# Patient Record
Sex: FEMALE | Race: WHITE | NOT HISPANIC OR LATINO | Employment: UNEMPLOYED | URBAN - METROPOLITAN AREA
[De-identification: names, ages, dates, MRNs, and addresses within clinical notes are randomized per-mention and may not be internally consistent; named-entity substitution may affect disease eponyms.]

---

## 2018-04-02 ENCOUNTER — HOSPITAL ENCOUNTER (OUTPATIENT)
Dept: GENERAL RADIOLOGY | Facility: HOSPITAL | Age: 28
Discharge: HOME | End: 2018-04-02
Attending: PAIN MEDICINE | Admitting: PAIN MEDICINE

## 2018-04-02 DIAGNOSIS — M54.42 CHRONIC BILATERAL LOW BACK PAIN WITH BILATERAL SCIATICA: Primary | ICD-10-CM

## 2018-04-02 DIAGNOSIS — G89.29 CHRONIC BILATERAL LOW BACK PAIN WITH BILATERAL SCIATICA: Primary | ICD-10-CM

## 2018-04-02 DIAGNOSIS — G89.29 CHRONIC BILATERAL LOW BACK PAIN WITH BILATERAL SCIATICA: ICD-10-CM

## 2018-04-02 DIAGNOSIS — M54.42 CHRONIC BILATERAL LOW BACK PAIN WITH BILATERAL SCIATICA: ICD-10-CM

## 2018-04-02 DIAGNOSIS — M54.41 CHRONIC BILATERAL LOW BACK PAIN WITH BILATERAL SCIATICA: Primary | ICD-10-CM

## 2018-04-02 DIAGNOSIS — M54.41 CHRONIC BILATERAL LOW BACK PAIN WITH BILATERAL SCIATICA: ICD-10-CM

## 2018-04-02 PROCEDURE — 72100 X-RAY EXAM L-S SPINE 2/3 VWS: CPT

## 2019-02-05 ENCOUNTER — OFFICE VISIT (OUTPATIENT)
Dept: INTERNAL MEDICINE | Facility: CLINIC | Age: 29
End: 2019-02-05

## 2019-02-05 VITALS
OXYGEN SATURATION: 98 % | HEART RATE: 96 BPM | WEIGHT: 223 LBS | BODY MASS INDEX: 35 KG/M2 | DIASTOLIC BLOOD PRESSURE: 72 MMHG | SYSTOLIC BLOOD PRESSURE: 110 MMHG | HEIGHT: 67 IN

## 2019-02-05 DIAGNOSIS — F41.9 ANXIETY AND DEPRESSION: Primary | ICD-10-CM

## 2019-02-05 DIAGNOSIS — F32.A ANXIETY AND DEPRESSION: Primary | ICD-10-CM

## 2019-02-05 PROCEDURE — 99203 OFFICE O/P NEW LOW 30 MIN: CPT | Performed by: NURSE PRACTITIONER

## 2019-02-05 RX ORDER — DULOXETIN HYDROCHLORIDE 20 MG/1
20 CAPSULE, DELAYED RELEASE ORAL DAILY
Qty: 30 CAPSULE | Refills: 1 | Status: SHIPPED | OUTPATIENT
Start: 2019-02-05 | End: 2019-03-07 | Stop reason: SDUPTHER

## 2019-02-05 NOTE — PROGRESS NOTES
Subjective   Bir Jean is a 28 y.o. female. Patient is here today for   Chief Complaint   Patient presents with   • Anxiety     Pt here to discuss anxiety/depression. Pt has not taken anything for this in the past.    .    History of Present Illness   New patient here to establish care.  Health history and questionnaire have been reviewed in its entirety. She has not had a primary care provider in several years.   Patient is here to discuss concerns with anxiety and depression. States that she had anxiety as a child, but has never received treatment for it, including counseling. She does use a weighted blanket for anxiety that helps with sleep.  She has had 2 back surgeries and continues with occasional back pain.  She takes Excedrin 2-3 times per week to help with this pain.  Because of the pain, she doesn't feel like she is as active as she would like to be.  This does seem to aggravate symptoms of depression.  Patient would like a recommendation for a psychologist or counselor.     The following portions of the patient's history were reviewed and updated as appropriate: allergies, current medications, past family history, past medical history, past social history, past surgical history and problem list.    Review of Systems   Constitutional: Negative.    Respiratory: Negative.    Cardiovascular: Negative.    Psychiatric/Behavioral: Positive for dysphoric mood. The patient is nervous/anxious.        Objective   Vitals:    02/05/19 1017   BP: 110/72   Pulse: 96   SpO2: 98%     Physical Exam   Constitutional: Vital signs are normal. She appears well-developed and well-nourished.   Cardiovascular: Normal rate, regular rhythm and normal heart sounds.   Pulmonary/Chest: Effort normal and breath sounds normal.   Skin: Skin is warm, dry and intact.   Psychiatric: She has a normal mood and affect. Her speech is normal and behavior is normal. Thought content normal.   Tearful at times during office visit   Nursing  note and vitals reviewed.      Assessment/Plan   Bri was seen today for anxiety.    Diagnoses and all orders for this visit:    Anxiety and depression  -     DULoxetine (CYMBALTA) 20 MG capsule; Take 1 capsule by mouth Daily.      Will start patient on cymbalta to see if this will help improve her pain also. F/u in one month for physical, fasting labs, and recheck on depression and anxiety.  She was also given a list of psychologists to call and set up counseling.

## 2019-03-01 DIAGNOSIS — Z00.00 ANNUAL PHYSICAL EXAM: Primary | ICD-10-CM

## 2019-03-04 LAB
25(OH)D3+25(OH)D2 SERPL-MCNC: 17.1 NG/ML (ref 30–100)
ALBUMIN SERPL-MCNC: 4.9 G/DL (ref 3.5–5.2)
ALBUMIN/GLOB SERPL: 1.9 G/DL
ALP SERPL-CCNC: 77 U/L (ref 39–117)
ALT SERPL-CCNC: 16 U/L (ref 1–33)
AST SERPL-CCNC: 30 U/L (ref 1–32)
BILIRUB SERPL-MCNC: 0.3 MG/DL (ref 0.1–1.2)
BUN SERPL-MCNC: 13 MG/DL (ref 6–20)
BUN/CREAT SERPL: 18.8 (ref 7–25)
CALCIUM SERPL-MCNC: 9.9 MG/DL (ref 8.6–10.5)
CHLORIDE SERPL-SCNC: 102 MMOL/L (ref 98–107)
CHOLEST SERPL-MCNC: 205 MG/DL (ref 0–200)
CO2 SERPL-SCNC: 25.1 MMOL/L (ref 22–29)
CREAT SERPL-MCNC: 0.69 MG/DL (ref 0.57–1)
ERYTHROCYTE [DISTWIDTH] IN BLOOD BY AUTOMATED COUNT: 12.7 % (ref 12.3–15.4)
GLOBULIN SER CALC-MCNC: 2.6 GM/DL
GLUCOSE SERPL-MCNC: 122 MG/DL (ref 65–99)
HCT VFR BLD AUTO: 46.1 % (ref 34–46.6)
HDLC SERPL-MCNC: 40 MG/DL (ref 40–60)
HGB BLD-MCNC: 15 G/DL (ref 12–15.9)
LDLC SERPL CALC-MCNC: 144 MG/DL (ref 0–100)
LDLC/HDLC SERPL: 3.61 {RATIO}
MCH RBC QN AUTO: 28.7 PG (ref 26.6–33)
MCHC RBC AUTO-ENTMCNC: 32.5 G/DL (ref 31.5–35.7)
MCV RBC AUTO: 88.3 FL (ref 79–97)
PLATELET # BLD AUTO: 267 10*3/MM3 (ref 140–450)
POTASSIUM SERPL-SCNC: 4 MMOL/L (ref 3.5–5.2)
PROT SERPL-MCNC: 7.5 G/DL (ref 6–8.5)
RBC # BLD AUTO: 5.22 10*6/MM3 (ref 3.77–5.28)
SODIUM SERPL-SCNC: 140 MMOL/L (ref 136–145)
TRIGL SERPL-MCNC: 104 MG/DL (ref 0–150)
TSH SERPL DL<=0.005 MIU/L-ACNC: 1.19 MIU/ML (ref 0.27–4.2)
VLDLC SERPL CALC-MCNC: 20.8 MG/DL (ref 5–40)
WBC # BLD AUTO: 6.83 10*3/MM3 (ref 3.4–10.8)

## 2019-03-06 LAB
HBA1C MFR BLD: 5.65 % (ref 4.8–5.6)
Lab: NORMAL
WRITTEN AUTHORIZATION: NORMAL

## 2019-03-07 ENCOUNTER — OFFICE VISIT (OUTPATIENT)
Dept: INTERNAL MEDICINE | Facility: CLINIC | Age: 29
End: 2019-03-07

## 2019-03-07 VITALS
DIASTOLIC BLOOD PRESSURE: 68 MMHG | WEIGHT: 226 LBS | HEART RATE: 76 BPM | OXYGEN SATURATION: 98 % | SYSTOLIC BLOOD PRESSURE: 110 MMHG | BODY MASS INDEX: 35.47 KG/M2 | HEIGHT: 67 IN

## 2019-03-07 DIAGNOSIS — Z23 NEED FOR TDAP VACCINATION: ICD-10-CM

## 2019-03-07 DIAGNOSIS — E78.5 HYPERLIPIDEMIA, UNSPECIFIED HYPERLIPIDEMIA TYPE: ICD-10-CM

## 2019-03-07 DIAGNOSIS — F41.9 ANXIETY AND DEPRESSION: ICD-10-CM

## 2019-03-07 DIAGNOSIS — R73.01 ELEVATED FASTING GLUCOSE: ICD-10-CM

## 2019-03-07 DIAGNOSIS — F32.A ANXIETY AND DEPRESSION: ICD-10-CM

## 2019-03-07 DIAGNOSIS — Z00.00 HEALTH CARE MAINTENANCE: Primary | ICD-10-CM

## 2019-03-07 DIAGNOSIS — E55.9 VITAMIN D DEFICIENCY: ICD-10-CM

## 2019-03-07 PROCEDURE — 90471 IMMUNIZATION ADMIN: CPT | Performed by: NURSE PRACTITIONER

## 2019-03-07 PROCEDURE — 90715 TDAP VACCINE 7 YRS/> IM: CPT | Performed by: NURSE PRACTITIONER

## 2019-03-07 PROCEDURE — 99214 OFFICE O/P EST MOD 30 MIN: CPT | Performed by: NURSE PRACTITIONER

## 2019-03-07 PROCEDURE — 99395 PREV VISIT EST AGE 18-39: CPT | Performed by: NURSE PRACTITIONER

## 2019-03-07 RX ORDER — DULOXETINE 40 MG/1
40 CAPSULE, DELAYED RELEASE ORAL DAILY
Qty: 30 CAPSULE | Refills: 3 | Status: SHIPPED | OUTPATIENT
Start: 2019-03-07 | End: 2019-03-11 | Stop reason: SDUPTHER

## 2019-03-07 NOTE — PROGRESS NOTES
"Subjective   Bri Jean is a 28 y.o. female and is here for a comprehensive physical exam. The patient reports no problems.    Patient is also here to follow up on anxiety.  She was recently started on Cymbalta.  States that she did have some headaches and nausea in the first couple of weeks, but those side effects have gone away.  States that she does feel better, but still has episodes of anxiety.  She has not noticed if the medication has helped with her pain at all. She hasn't started counseling yet, but will be doing that soon.    Do you take any herbs or supplements that were not prescribed by a doctor? no  Are you taking aspirin daily? no     History:  Pap 2017    The following portions of the patient's history were reviewed and updated as appropriate: allergies, current medications, past family history, past medical history, past social history, past surgical history and problem list.    Review of Systems  Do you have pain that bothers you in your daily life? sees pain management  Pertinent items are noted in HPI.    Objective   /68 (BP Location: Left arm, Patient Position: Sitting, Cuff Size: Large Adult)   Pulse 76   Ht 170.2 cm (67\")   Wt 103 kg (226 lb)   SpO2 98%   BMI 35.40 kg/m²     General Appearance:    Alert, cooperative, no distress, appears stated age   Head:    Normocephalic, without obvious abnormality, atraumatic   Eyes:    PERRL, conjunctiva/corneas clear, EOM's intact, fundi     benign, both eyes   Ears:    Normal TM's and external ear canals, both ears   Nose:   Nares normal, septum midline, mucosa normal, no drainage    or sinus tenderness   Throat:   Lips, mucosa, and tongue normal; teeth and gums normal   Neck:   Supple, symmetrical, trachea midline, no adenopathy;     thyroid:  no enlargement/tenderness/nodules; no carotid    bruit or JVD   Back:     Symmetric, no curvature, ROM normal, no CVA tenderness   Lungs:     Clear to auscultation bilaterally, respirations " unlabored   Chest Wall:    No tenderness or deformity    Heart:    Regular rate and rhythm, S1 and S2 normal, no murmur, rub   or gallop       Abdomen:     Soft, non-tender, bowel sounds active all four quadrants,     no masses, no organomegaly           Extremities:   Extremities normal, atraumatic, no cyanosis or edema   Pulses:   2+ and symmetric all extremities   Skin:   Skin color, texture, turgor normal, no rashes or lesions   Lymph nodes:   Cervical, supraclavicular, and axillary nodes normal   Neurologic:   CNII-XII intact, normal strength, sensation and reflexes     throughout     Orders Only on 03/01/2019   Component Date Value Ref Range Status   • Glucose 03/04/2019 122* 65 - 99 mg/dL Final   • BUN 03/04/2019 13  6 - 20 mg/dL Final   • Creatinine 03/04/2019 0.69  0.57 - 1.00 mg/dL Final   • eGFR Non African Am 03/04/2019 101  >60 mL/min/1.73 Final   • eGFR African Am 03/04/2019 123  >60 mL/min/1.73 Final   • BUN/Creatinine Ratio 03/04/2019 18.8  7.0 - 25.0 Final   • Sodium 03/04/2019 140  136 - 145 mmol/L Final   • Potassium 03/04/2019 4.0  3.5 - 5.2 mmol/L Final   • Chloride 03/04/2019 102  98 - 107 mmol/L Final   • Total CO2 03/04/2019 25.1  22.0 - 29.0 mmol/L Final   • Calcium 03/04/2019 9.9  8.6 - 10.5 mg/dL Final   • Total Protein 03/04/2019 7.5  6.0 - 8.5 g/dL Final   • Albumin 03/04/2019 4.90  3.50 - 5.20 g/dL Final   • Globulin 03/04/2019 2.6  gm/dL Final   • A/G Ratio 03/04/2019 1.9  g/dL Final   • Total Bilirubin 03/04/2019 0.3  0.1 - 1.2 mg/dL Final   • Alkaline Phosphatase 03/04/2019 77  39 - 117 U/L Final   • AST (SGOT) 03/04/2019 30  1 - 32 U/L Final   • ALT (SGPT) 03/04/2019 16  1 - 33 U/L Final   • WBC 03/04/2019 6.83  3.40 - 10.80 10*3/mm3 Final   • RBC 03/04/2019 5.22  3.77 - 5.28 10*6/mm3 Final   • Hemoglobin 03/04/2019 15.0  12.0 - 15.9 g/dL Final   • Hematocrit 03/04/2019 46.1  34.0 - 46.6 % Final   • MCV 03/04/2019 88.3  79.0 - 97.0 fL Final   • MCH 03/04/2019 28.7  26.6 - 33.0 pg  Final   • MCHC 03/04/2019 32.5  31.5 - 35.7 g/dL Final   • RDW 03/04/2019 12.7  12.3 - 15.4 % Final   • Platelets 03/04/2019 267  140 - 450 10*3/mm3 Final   • Total Cholesterol 03/04/2019 205* 0 - 200 mg/dL Final   • Triglycerides 03/04/2019 104  0 - 150 mg/dL Final   • HDL Cholesterol 03/04/2019 40  40 - 60 mg/dL Final   • VLDL Cholesterol 03/04/2019 20.8  5 - 40 mg/dL Final   • LDL Cholesterol  03/04/2019 144* 0 - 100 mg/dL Final   • LDL/HDL Ratio 03/04/2019 3.61   Final   • 25 Hydroxy, Vitamin D 03/04/2019 17.1* 30.0 - 100.0 ng/ml Final    Comment: Reference Range for Total Vitamin D 25(OH)  Deficiency <20.0 ng/mL  Insufficiency 21-29 ng/mL  Sufficiency  ng/mL  Toxicity >100 ng/ml     • TSH 03/04/2019 1.190  0.270 - 4.200 mIU/mL Final   • Hemoglobin A1C 03/04/2019 5.65* 4.80 - 5.60 % Final    Comment: Hemoglobin A1C Ranges:  Increased Risk for Diabetes  5.7% to 6.4%  Diabetes                     >= 6.5%  Diabetic Goal                < 7.0%     • Please note 03/04/2019 Comment   Final    Comment: We have received your request for additional testing or test  verification.  You will be notified if we are unable to process  your request.     • Written Authorization 03/04/2019 Comment   Final    Comment: Written Authorization Received.  Authorization received from WRITTEN REQUEST 03-  Logged by Marily Fleming       Reviewed labs with patient.        Assessment/Plan   Healthy female exam.      1. Bri was seen today for annual exam and anxiety.    Diagnoses and all orders for this visit:    Health care maintenance  -     Tdap Vaccine Greater Than or Equal To 8yo IM    Anxiety and depression  -     DULoxetine 40 MG capsule delayed-release particles; Take 1 capsule by mouth Daily.    Need for Tdap vaccination  -     Tdap Vaccine Greater Than or Equal To 8yo IM    Vitamin D deficiency    Hyperlipidemia, unspecified hyperlipidemia type  -     Comprehensive Metabolic Panel; Future  -     Lipid Panel With /  Chol / HDL Ratio; Future    Elevated fasting glucose  -     Comprehensive Metabolic Panel; Future  -     Hemoglobin A1c; Future    vit d deficiency - Patient will start vit D3 2000 IU daily.     Depression/anxiety - will increase cymbalta to 40 mg daily.    2. Patient Counseling:  --Nutrition: Stressed importance of moderation in sodium/caffeine intake, saturated fat and cholesterol, caloric balance, sufficient intake of fresh fruits, vegetables, fiber, calcium, iron.  Patient was given handouts on dietary changes to help with cholesterol and blood sugar  --Exercise: Stressed the importance of regular exercise. Walks dogs  --Injury prevention: Discussed safety belts, safety helmets, smoke detector.   --Dental health: Discussed importance of regular tooth brushing, flossing, and dental visits.  --Immunizations reviewed. TdaP today    3. Discussed the patient's BMI with her.  The BMI is above average; BMI management plan is completed  4. Follow up in 6 months for recheck and fasting labs

## 2019-03-11 RX ORDER — DULOXETIN HYDROCHLORIDE 20 MG/1
20 CAPSULE, DELAYED RELEASE ORAL 2 TIMES DAILY
Qty: 60 CAPSULE | Refills: 2 | Status: SHIPPED | OUTPATIENT
Start: 2019-03-11 | End: 2019-04-19

## 2019-04-19 RX ORDER — DULOXETIN HYDROCHLORIDE 20 MG/1
20 CAPSULE, DELAYED RELEASE ORAL 2 TIMES DAILY
Qty: 60 CAPSULE | Refills: 5 | Status: SHIPPED | OUTPATIENT
Start: 2019-04-19 | End: 2019-05-03

## 2019-05-03 ENCOUNTER — TELEPHONE (OUTPATIENT)
Dept: INTERNAL MEDICINE | Facility: CLINIC | Age: 29
End: 2019-05-03

## 2019-05-03 RX ORDER — DULOXETIN HYDROCHLORIDE 60 MG/1
60 CAPSULE, DELAYED RELEASE ORAL DAILY
Qty: 30 CAPSULE | Refills: 1 | Status: SHIPPED | OUTPATIENT
Start: 2019-05-03 | End: 2019-07-11 | Stop reason: SDUPTHER

## 2019-05-03 NOTE — TELEPHONE ENCOUNTER
Regarding: Prescription Question  Contact: [phone]  ----- Message from [employer], Generic sent at 5/3/2019  2:19 PM EDT -----    Hello, I am currently prescribed deluxotine HCL 20mg BID. The increase from 20mg to 40mg a couple months ago has helped with anxiety and depression up until recently where I have started school along with other serious issues that have come up. My anxiety and depression almost feels like it did before I was on this medication. The 20mg BID has still not made a difference in my back pain either.  I was hoping I could try an increase, or come in and discuss this if necessary? Thank you

## 2019-07-11 ENCOUNTER — OFFICE VISIT (OUTPATIENT)
Dept: INTERNAL MEDICINE | Facility: CLINIC | Age: 29
End: 2019-07-11

## 2019-07-11 VITALS
TEMPERATURE: 98 F | BODY MASS INDEX: 35.39 KG/M2 | SYSTOLIC BLOOD PRESSURE: 112 MMHG | WEIGHT: 225.5 LBS | OXYGEN SATURATION: 98 % | DIASTOLIC BLOOD PRESSURE: 78 MMHG | HEIGHT: 67 IN | HEART RATE: 87 BPM

## 2019-07-11 DIAGNOSIS — F41.9 ANXIETY AND DEPRESSION: Primary | ICD-10-CM

## 2019-07-11 DIAGNOSIS — J06.9 ACUTE URI: ICD-10-CM

## 2019-07-11 DIAGNOSIS — F32.A ANXIETY AND DEPRESSION: Primary | ICD-10-CM

## 2019-07-11 PROCEDURE — 99213 OFFICE O/P EST LOW 20 MIN: CPT | Performed by: NURSE PRACTITIONER

## 2019-07-11 RX ORDER — DULOXETIN HYDROCHLORIDE 60 MG/1
60 CAPSULE, DELAYED RELEASE ORAL DAILY
Qty: 90 CAPSULE | Refills: 1 | Status: SHIPPED | OUTPATIENT
Start: 2019-07-11 | End: 2020-04-16 | Stop reason: SDUPTHER

## 2019-07-11 RX ORDER — BENZONATATE 100 MG/1
100 CAPSULE ORAL 3 TIMES DAILY PRN
Qty: 30 CAPSULE | Refills: 0 | Status: SHIPPED | OUTPATIENT
Start: 2019-07-11 | End: 2020-06-08

## 2019-07-11 NOTE — PROGRESS NOTES
Subjective   Bri Jean is a 28 y.o. female. Patient is here today for   Chief Complaint   Patient presents with   • Anxiety     PT HERE TO FOLLOW UP ON ANXIETY DEPRESSION. PT'S CYMBALTA WAS INCREASED FROM 40MG TO 60 MG A DAY.   • Depression   • URI     PT C/O PRODUCTIVE COUGH, CHEST AND THROAT HURT WHILE COUGHING X 5 DAYS.   .    History of Present Illness   Patient is here to follow up on anxiety and depression. Patient's cymbalta was increased from 40 - 60 mg daily. She is feeling more tired, but her anxiety is better.  States that she was late to work a couple of times because it was hard for her to get up in the morning because she was tired.    C/o cough x 5 days associated with chest congestion, chest discomfort when she coughs (feels like it is on fire). Denies fever. It seems to be getting slowly better. She has taken Theraflu with some relief.    The following portions of the patient's history were reviewed and updated as appropriate: allergies, current medications, past family history, past medical history, past social history, past surgical history and problem list.    Review of Systems   Constitutional: Positive for fatigue.   HENT: Positive for congestion. Negative for postnasal drip, sinus pressure and sinus pain.    Respiratory: Positive for cough and wheezing. Negative for shortness of breath.    Cardiovascular: Negative.    Psychiatric/Behavioral: Negative.        Objective   Vitals:    07/11/19 0828   BP: 112/78   Pulse: 87   Temp: 98 °F (36.7 °C)   SpO2: 98%     Physical Exam   Constitutional: Vital signs are normal. She appears well-developed and well-nourished.   HENT:   Right Ear: Ear canal normal. A middle ear effusion is present.   Left Ear: Tympanic membrane and ear canal normal.   Mouth/Throat: Oropharynx is clear and moist and mucous membranes are normal.   Cardiovascular: Normal rate, regular rhythm and normal heart sounds.   Pulmonary/Chest: Effort normal and breath sounds normal.    Skin: Skin is warm, dry and intact.   Psychiatric: She has a normal mood and affect. Her speech is normal and behavior is normal. Thought content normal.   Nursing note and vitals reviewed.      Assessment/Plan   Bri was seen today for anxiety, depression and uri.    Diagnoses and all orders for this visit:    Anxiety and depression  -     DULoxetine (CYMBALTA) 60 MG capsule; Take 1 capsule by mouth Daily.    Acute URI  -     benzonatate (TESSALON PERLES) 100 MG capsule; Take 1 capsule by mouth 3 (Three) Times a Day As Needed for Cough.    Will try taking her Cymbalta at lunchtime rather than in the morning to see if that helps with her tiredness in the morning. F/u in 6 months    URI -patient symptoms are improving.  Instructed her to take Mucinex.  Tessalon as needed for cough

## 2019-08-15 ENCOUNTER — TRANSCRIBE ORDERS (OUTPATIENT)
Dept: ADMINISTRATIVE | Facility: HOSPITAL | Age: 29
End: 2019-08-15

## 2019-08-15 DIAGNOSIS — S33.5XXD SPRAIN OF LIGAMENTS OF LUMBAR SPINE, SUBSEQUENT ENCOUNTER: ICD-10-CM

## 2019-08-15 DIAGNOSIS — S23.9XXA SPRAIN OF THORACIC REGION: ICD-10-CM

## 2019-08-15 DIAGNOSIS — T14.8XXA SPRAIN: Primary | ICD-10-CM

## 2019-09-06 ENCOUNTER — RESULTS ENCOUNTER (OUTPATIENT)
Dept: INTERNAL MEDICINE | Facility: CLINIC | Age: 29
End: 2019-09-06

## 2019-09-06 DIAGNOSIS — R73.01 ELEVATED FASTING GLUCOSE: ICD-10-CM

## 2019-09-06 DIAGNOSIS — E78.5 HYPERLIPIDEMIA, UNSPECIFIED HYPERLIPIDEMIA TYPE: ICD-10-CM

## 2020-04-02 DIAGNOSIS — F32.A ANXIETY AND DEPRESSION: ICD-10-CM

## 2020-04-02 DIAGNOSIS — F41.9 ANXIETY AND DEPRESSION: ICD-10-CM

## 2020-04-02 RX ORDER — DULOXETIN HYDROCHLORIDE 60 MG/1
60 CAPSULE, DELAYED RELEASE ORAL DAILY
Qty: 90 CAPSULE | Refills: 1 | OUTPATIENT
Start: 2020-04-02

## 2020-04-16 ENCOUNTER — TELEPHONE (OUTPATIENT)
Dept: INTERNAL MEDICINE | Facility: CLINIC | Age: 30
End: 2020-04-16

## 2020-04-16 DIAGNOSIS — F32.A ANXIETY AND DEPRESSION: ICD-10-CM

## 2020-04-16 DIAGNOSIS — F41.9 ANXIETY AND DEPRESSION: ICD-10-CM

## 2020-04-16 RX ORDER — DULOXETIN HYDROCHLORIDE 60 MG/1
60 CAPSULE, DELAYED RELEASE ORAL DAILY
Qty: 30 CAPSULE | Refills: 0 | Status: SHIPPED | OUTPATIENT
Start: 2020-04-16 | End: 2020-06-08

## 2020-04-16 NOTE — TELEPHONE ENCOUNTER
PATIENT CALLED TO REQUEST A REFILL ON HER DULOXETINE 60 MG MEDICATION AND SHE WOULD LIKE FOR THIS TO GO TO THE [facility] THAT IS IN HER CHART. ALSO THE PATIENT HAS A SCHEDULED APPOINTMENT ON 05/11/2020 WITH [facility]. THE PATIENT CAN BE REACHED [phone].

## 2020-06-08 ENCOUNTER — OFFICE VISIT (OUTPATIENT)
Dept: INTERNAL MEDICINE | Facility: CLINIC | Age: 30
End: 2020-06-08

## 2020-06-08 VITALS
BODY MASS INDEX: 34.53 KG/M2 | HEART RATE: 75 BPM | WEIGHT: 220 LBS | SYSTOLIC BLOOD PRESSURE: 118 MMHG | OXYGEN SATURATION: 99 % | DIASTOLIC BLOOD PRESSURE: 76 MMHG | HEIGHT: 67 IN

## 2020-06-08 DIAGNOSIS — F41.9 ANXIETY AND DEPRESSION: Primary | ICD-10-CM

## 2020-06-08 DIAGNOSIS — F32.A ANXIETY AND DEPRESSION: Primary | ICD-10-CM

## 2020-06-08 PROBLEM — S33.5XXA LUMBAR SPRAIN: Status: ACTIVE | Noted: 2019-08-15

## 2020-06-08 PROBLEM — S23.9XXA THORACIC SPRAIN: Status: ACTIVE | Noted: 2019-08-15

## 2020-06-08 PROCEDURE — 99213 OFFICE O/P EST LOW 20 MIN: CPT | Performed by: NURSE PRACTITIONER

## 2020-06-08 RX ORDER — DULOXETIN HYDROCHLORIDE 30 MG/1
30 CAPSULE, DELAYED RELEASE ORAL DAILY
Qty: 90 CAPSULE | Refills: 1 | Status: SHIPPED | OUTPATIENT
Start: 2020-06-08 | End: 2021-05-11 | Stop reason: SDUPTHER

## 2020-06-08 RX ORDER — DULOXETIN HYDROCHLORIDE 60 MG/1
60 CAPSULE, DELAYED RELEASE ORAL DAILY
Qty: 90 CAPSULE | Refills: 1 | Status: SHIPPED | OUTPATIENT
Start: 2020-06-08 | End: 2021-05-11 | Stop reason: SDUPTHER

## 2020-06-08 NOTE — PROGRESS NOTES
Subjective   Bri Jean is a 29 y.o. female. Patient is here today for   Chief Complaint   Patient presents with   • Anxiety     Pt here to discuss anxiety. Pt feels like her cymbalta is not working entirely.    .    History of Present Illness   Answers for HPI/ROS submitted by the patient on 6/7/2020   What is the primary reason for your visit?: Other  Please describe your symptoms.: 60mg deloxetine has worked well for months. Recently experiencing heightened anxiety to point of nervous breakdowns at times. Would like to discuss.  Have you had these symptoms before?: Yes  How long have you been having these symptoms?: 1-2 weeks  Please list any medications you are currently taking for this condition.: Duloxetine 60 mg QD  Please describe any probable cause for these symptoms. : Anxiety/ Depression    Patient is here to follow-up on anxiety.  She is currently on Cymbalta 60 mg and feels like it wears off right before her next dose is due.  She has had some increased anxiety    The following portions of the patient's history were reviewed and updated as appropriate: allergies, current medications, past family history, past medical history, past social history, past surgical history and problem list.    Review of Systems   Constitutional: Negative.    Respiratory: Negative.    Cardiovascular: Negative.    Psychiatric/Behavioral: The patient is nervous/anxious.        Objective   Vitals:    06/08/20 1259   BP: 118/76   Pulse: 75   SpO2: 99%     Body mass index is 34.46 kg/m².  Physical Exam   Constitutional: Vital signs are normal. She appears well-developed and well-nourished.   Cardiovascular: Normal rate, regular rhythm and normal heart sounds.   Pulmonary/Chest: Effort normal and breath sounds normal.   Skin: Skin is warm, dry and intact.   Psychiatric: She has a normal mood and affect. Her speech is normal and behavior is normal. Thought content normal.   Nursing note and vitals reviewed.      Assessment/Plan    Bri was seen today for anxiety.    Diagnoses and all orders for this visit:    Anxiety and depression  -     DULoxetine (CYMBALTA) 60 MG capsule; Take 1 capsule by mouth Daily.  -     DULoxetine (CYMBALTA) 30 MG capsule; Take 1 capsule by mouth Daily.    Will increase patient's Cymbalta to 90 mg daily.  She will take 60 mg in the morning and 30 mg in the evening to see if that helps keep a steady amount of medication in her system and helps with her symptoms.  She will follow-up for physical in 6 months, but follow-up sooner if needed

## 2020-12-10 DIAGNOSIS — E78.5 HYPERLIPIDEMIA, UNSPECIFIED HYPERLIPIDEMIA TYPE: ICD-10-CM

## 2020-12-10 DIAGNOSIS — R73.01 ELEVATED FASTING GLUCOSE: ICD-10-CM

## 2020-12-10 DIAGNOSIS — Z00.00 HEALTH CARE MAINTENANCE: Primary | ICD-10-CM

## 2020-12-10 DIAGNOSIS — E55.9 VITAMIN D DEFICIENCY: ICD-10-CM

## 2020-12-12 LAB
25(OH)D3+25(OH)D2 SERPL-MCNC: 18.6 NG/ML (ref 30–100)
ALBUMIN SERPL-MCNC: 4.4 G/DL (ref 3.5–5.2)
ALBUMIN/GLOB SERPL: 1.7 G/DL
ALP SERPL-CCNC: 71 U/L (ref 39–117)
ALT SERPL-CCNC: 13 U/L (ref 1–33)
AST SERPL-CCNC: 23 U/L (ref 1–32)
BASOPHILS # BLD AUTO: 0.03 10*3/MM3 (ref 0–0.2)
BASOPHILS NFR BLD AUTO: 0.4 % (ref 0–1.5)
BILIRUB SERPL-MCNC: 0.4 MG/DL (ref 0–1.2)
BUN SERPL-MCNC: 10 MG/DL (ref 6–20)
BUN/CREAT SERPL: 17.9 (ref 7–25)
CALCIUM SERPL-MCNC: 9.3 MG/DL (ref 8.6–10.5)
CHLORIDE SERPL-SCNC: 100 MMOL/L (ref 98–107)
CHOLEST SERPL-MCNC: 207 MG/DL (ref 0–200)
CHOLEST/HDLC SERPL: 5.59 {RATIO}
CO2 SERPL-SCNC: 24.9 MMOL/L (ref 22–29)
CREAT SERPL-MCNC: 0.56 MG/DL (ref 0.57–1)
EOSINOPHIL # BLD AUTO: 0.07 10*3/MM3 (ref 0–0.4)
EOSINOPHIL NFR BLD AUTO: 1 % (ref 0.3–6.2)
ERYTHROCYTE [DISTWIDTH] IN BLOOD BY AUTOMATED COUNT: 12.7 % (ref 12.3–15.4)
GLOBULIN SER CALC-MCNC: 2.6 GM/DL
GLUCOSE SERPL-MCNC: 96 MG/DL (ref 65–99)
HBA1C MFR BLD: 5.3 % (ref 4.8–5.6)
HCT VFR BLD AUTO: 44 % (ref 34–46.6)
HDLC SERPL-MCNC: 37 MG/DL (ref 40–60)
HGB BLD-MCNC: 14.7 G/DL (ref 12–15.9)
IMM GRANULOCYTES # BLD AUTO: 0.02 10*3/MM3 (ref 0–0.05)
IMM GRANULOCYTES NFR BLD AUTO: 0.3 % (ref 0–0.5)
LDLC SERPL CALC-MCNC: 147 MG/DL (ref 0–100)
LYMPHOCYTES # BLD AUTO: 1.67 10*3/MM3 (ref 0.7–3.1)
LYMPHOCYTES NFR BLD AUTO: 24.2 % (ref 19.6–45.3)
MCH RBC QN AUTO: 29.5 PG (ref 26.6–33)
MCHC RBC AUTO-ENTMCNC: 33.4 G/DL (ref 31.5–35.7)
MCV RBC AUTO: 88.2 FL (ref 79–97)
MONOCYTES # BLD AUTO: 0.35 10*3/MM3 (ref 0.1–0.9)
MONOCYTES NFR BLD AUTO: 5.1 % (ref 5–12)
NEUTROPHILS # BLD AUTO: 4.75 10*3/MM3 (ref 1.7–7)
NEUTROPHILS NFR BLD AUTO: 69 % (ref 42.7–76)
NRBC BLD AUTO-RTO: 0 /100 WBC (ref 0–0.2)
PLATELET # BLD AUTO: 243 10*3/MM3 (ref 140–450)
POTASSIUM SERPL-SCNC: 4.1 MMOL/L (ref 3.5–5.2)
PROT SERPL-MCNC: 7 G/DL (ref 6–8.5)
RBC # BLD AUTO: 4.99 10*6/MM3 (ref 3.77–5.28)
SODIUM SERPL-SCNC: 134 MMOL/L (ref 136–145)
TRIGL SERPL-MCNC: 127 MG/DL (ref 0–150)
TSH SERPL DL<=0.005 MIU/L-ACNC: 0.87 UIU/ML (ref 0.27–4.2)
VLDLC SERPL CALC-MCNC: 23 MG/DL (ref 5–40)
WBC # BLD AUTO: 6.89 10*3/MM3 (ref 3.4–10.8)

## 2020-12-18 ENCOUNTER — OFFICE VISIT (OUTPATIENT)
Dept: INTERNAL MEDICINE | Facility: CLINIC | Age: 30
End: 2020-12-18

## 2020-12-18 VITALS
DIASTOLIC BLOOD PRESSURE: 70 MMHG | WEIGHT: 210 LBS | HEART RATE: 80 BPM | HEIGHT: 67 IN | BODY MASS INDEX: 32.96 KG/M2 | OXYGEN SATURATION: 98 % | SYSTOLIC BLOOD PRESSURE: 108 MMHG

## 2020-12-18 DIAGNOSIS — Z00.00 HEALTHCARE MAINTENANCE: Primary | ICD-10-CM

## 2020-12-18 DIAGNOSIS — F41.9 ANXIETY AND DEPRESSION: ICD-10-CM

## 2020-12-18 DIAGNOSIS — F32.A ANXIETY AND DEPRESSION: ICD-10-CM

## 2020-12-18 DIAGNOSIS — E55.9 VITAMIN D DEFICIENCY: ICD-10-CM

## 2020-12-18 PROCEDURE — 99213 OFFICE O/P EST LOW 20 MIN: CPT | Performed by: NURSE PRACTITIONER

## 2020-12-18 PROCEDURE — 99395 PREV VISIT EST AGE 18-39: CPT | Performed by: NURSE PRACTITIONER

## 2020-12-18 NOTE — PROGRESS NOTES
"Subjective   Bri Jean is a 30 y.o. female and is here for a comprehensive physical exam. The patient reports :     She has a few lesions that she would like to have a dermatologist look at. She is wanting the name of a dermatologist.    C/o occasional upset stomach and loose stools.  States that it has improved since she has improved her diet.  She thinks that it may be caused by stress because she is going to school full-time and working 2 jobs.    Do you take any herbs or supplements that were not prescribed by a doctor? no    Patient is also here to follow up on depression and anxiety.  She is doing well with Cymbalta.  She is taking 60 mg in the morning and then 30 mg in the evening.      History:  LMP: No LMP recorded.  Last pap date: a couple years ago.   Abnormal pap? no  Has an IUD    The following portions of the patient's history were reviewed and updated as appropriate: allergies, current medications, past family history, past medical history, past social history, past surgical history and problem list.    Review of Systems  Do you have pain that bothers you in your daily life? no  Pertinent items are noted in HPI.    Objective   /70 (BP Location: Left arm, Patient Position: Sitting, Cuff Size: Large Adult)   Pulse 80   Ht 170.2 cm (67\")   Wt 95.3 kg (210 lb)   SpO2 98%   BMI 32.89 kg/m²     General Appearance:    Alert, cooperative, no distress, appears stated age   Head:    Normocephalic, without obvious abnormality, atraumatic   Eyes:    PERRL, conjunctiva/corneas clear, EOM's intact, both eyes   Ears:    Normal TM's and external ear canals, both ears   Nose:   Deferred due to mask   Throat:   Deferred due to mask   Neck:   Supple, symmetrical, trachea midline, no adenopathy;     thyroid:  no enlargement/tenderness/nodules; no carotid    bruit   Back:     Symmetric, no curvature, ROM normal, no CVA tenderness   Lungs:     Clear to auscultation bilaterally, respirations unlabored "   Chest Wall:    No tenderness or deformity    Heart:    Regular rate and rhythm, S1 and S2 normal, no murmur       Abdomen:     Soft, non-tender, bowel sounds active all four quadrants,     no masses, no organomegaly           Extremities:   Extremities normal, atraumatic, no cyanosis or edema   Pulses:   2+ and symmetric all extremities   Skin:   Skin color, texture, turgor normal, no rashes; few dry lesions on right upper thigh; multiple nevi   Lymph nodes:   Cervical, supraclavicular, and axillary nodes normal   Neurologic:   Grossly intact, normal strength, sensation and reflexes     throughout        Orders Only on 12/10/2020   Component Date Value Ref Range Status   • WBC 12/11/2020 6.89  3.40 - 10.80 10*3/mm3 Final   • RBC 12/11/2020 4.99  3.77 - 5.28 10*6/mm3 Final   • Hemoglobin 12/11/2020 14.7  12.0 - 15.9 g/dL Final   • Hematocrit 12/11/2020 44.0  34.0 - 46.6 % Final   • MCV 12/11/2020 88.2  79.0 - 97.0 fL Final   • MCH 12/11/2020 29.5  26.6 - 33.0 pg Final   • MCHC 12/11/2020 33.4  31.5 - 35.7 g/dL Final   • RDW 12/11/2020 12.7  12.3 - 15.4 % Final   • Platelets 12/11/2020 243  140 - 450 10*3/mm3 Final   • Neutrophil Rel % 12/11/2020 69.0  42.7 - 76.0 % Final   • Lymphocyte Rel % 12/11/2020 24.2  19.6 - 45.3 % Final   • Monocyte Rel % 12/11/2020 5.1  5.0 - 12.0 % Final   • Eosinophil Rel % 12/11/2020 1.0  0.3 - 6.2 % Final   • Basophil Rel % 12/11/2020 0.4  0.0 - 1.5 % Final   • Neutrophils Absolute 12/11/2020 4.75  1.70 - 7.00 10*3/mm3 Final   • Lymphocytes Absolute 12/11/2020 1.67  0.70 - 3.10 10*3/mm3 Final   • Monocytes Absolute 12/11/2020 0.35  0.10 - 0.90 10*3/mm3 Final   • Eosinophils Absolute 12/11/2020 0.07  0.00 - 0.40 10*3/mm3 Final   • Basophils Absolute 12/11/2020 0.03  0.00 - 0.20 10*3/mm3 Final   • Immature Granulocyte Rel % 12/11/2020 0.3  0.0 - 0.5 % Final   • Immature Grans Absolute 12/11/2020 0.02  0.00 - 0.05 10*3/mm3 Final   • nRBC 12/11/2020 0.0  0.0 - 0.2 /100 WBC Final   •  Glucose 12/11/2020 96  65 - 99 mg/dL Final   • BUN 12/11/2020 10  6 - 20 mg/dL Final   • Creatinine 12/11/2020 0.56* 0.57 - 1.00 mg/dL Final   • eGFR Non African Am 12/11/2020 127  >60 mL/min/1.73 Final   • eGFR African Am 12/11/2020 >150  >60 mL/min/1.73 Final   • BUN/Creatinine Ratio 12/11/2020 17.9  7.0 - 25.0 Final   • Sodium 12/11/2020 134* 136 - 145 mmol/L Final   • Potassium 12/11/2020 4.1  3.5 - 5.2 mmol/L Final   • Chloride 12/11/2020 100  98 - 107 mmol/L Final   • Total CO2 12/11/2020 24.9  22.0 - 29.0 mmol/L Final   • Calcium 12/11/2020 9.3  8.6 - 10.5 mg/dL Final   • Total Protein 12/11/2020 7.0  6.0 - 8.5 g/dL Final   • Albumin 12/11/2020 4.40  3.50 - 5.20 g/dL Final   • Globulin 12/11/2020 2.6  gm/dL Final   • A/G Ratio 12/11/2020 1.7  g/dL Final   • Total Bilirubin 12/11/2020 0.4  0.0 - 1.2 mg/dL Final   • Alkaline Phosphatase 12/11/2020 71  39 - 117 U/L Final   • AST (SGOT) 12/11/2020 23  1 - 32 U/L Final   • ALT (SGPT) 12/11/2020 13  1 - 33 U/L Final   • Total Cholesterol 12/11/2020 207* 0 - 200 mg/dL Final   • Triglycerides 12/11/2020 127  0 - 150 mg/dL Final   • HDL Cholesterol 12/11/2020 37* 40 - 60 mg/dL Final   • VLDL Cholesterol Marcial 12/11/2020 23  5 - 40 mg/dL Final   • LDL Chol Calc (NIH) 12/11/2020 147* 0 - 100 mg/dL Final   • Chol/HDL Ratio 12/11/2020 5.59   Final   • Hemoglobin A1C 12/11/2020 5.30  4.80 - 5.60 % Final    Comment: Hemoglobin A1C Ranges:  Increased Risk for Diabetes  5.7% to 6.4%  Diabetes                     >= 6.5%  Diabetic Goal                < 7.0%     • 25 Hydroxy, Vitamin D 12/11/2020 18.6* 30.0 - 100.0 ng/ml Final    Comment: Results may be falsely increased if patient taking Biotin.  Reference Range for Total Vitamin D 25(OH)  Deficiency <20.0 ng/mL  Insufficiency 21-29 ng/mL  Sufficiency  ng/mL  Toxicity >100 ng/ml     • TSH 12/11/2020 0.867  0.270 - 4.200 uIU/mL Final     Reviewed labs with patient.     Assessment/Plan   Healthy female exam.      1.  Diagnoses and all orders for this visit:    1. Healthcare maintenance (Primary)    2. Anxiety and depression    3. Vitamin D deficiency    anxiety and depression - continue cymbalta    Vit d deficiency - Patient will start vit D3 2000 IU daily    2. Patient Counseling:  --Nutrition: Stressed importance of moderation in sodium/caffeine intake, saturated fat and cholesterol, caloric balance, sufficient intake of fresh fruits, vegetables, fiber, calcium, iron.  --Exercise: Stressed the importance of regular exercise.   --Injury prevention: Discussed safety belts, safety helmets, smoke detector.   --Dental health: Discussed importance of regular tooth brushing, flossing, and dental visits. Dr. Granados  --Immunizations reviewed.    3. Follow up in one year for physical and fasting labs

## 2021-04-16 ENCOUNTER — BULK ORDERING (OUTPATIENT)
Dept: CASE MANAGEMENT | Facility: OTHER | Age: 31
End: 2021-04-16

## 2021-04-16 DIAGNOSIS — Z23 IMMUNIZATION DUE: ICD-10-CM

## 2021-05-11 DIAGNOSIS — F41.9 ANXIETY AND DEPRESSION: ICD-10-CM

## 2021-05-11 DIAGNOSIS — F32.A ANXIETY AND DEPRESSION: ICD-10-CM

## 2021-05-11 RX ORDER — DULOXETIN HYDROCHLORIDE 60 MG/1
60 CAPSULE, DELAYED RELEASE ORAL DAILY
Qty: 90 CAPSULE | Refills: 3 | Status: SHIPPED | OUTPATIENT
Start: 2021-05-11 | End: 2022-09-01 | Stop reason: SDUPTHER

## 2021-05-11 RX ORDER — DULOXETIN HYDROCHLORIDE 30 MG/1
30 CAPSULE, DELAYED RELEASE ORAL DAILY
Qty: 90 CAPSULE | Refills: 2 | Status: SHIPPED | OUTPATIENT
Start: 2021-05-11 | End: 2022-09-01 | Stop reason: SDUPTHER

## 2021-12-13 DIAGNOSIS — R73.01 ELEVATED FASTING GLUCOSE: ICD-10-CM

## 2021-12-13 DIAGNOSIS — E78.5 HYPERLIPIDEMIA, UNSPECIFIED HYPERLIPIDEMIA TYPE: ICD-10-CM

## 2021-12-13 DIAGNOSIS — E55.9 VITAMIN D DEFICIENCY: ICD-10-CM

## 2021-12-13 DIAGNOSIS — Z00.00 HEALTHCARE MAINTENANCE: Primary | ICD-10-CM

## 2022-01-21 LAB
25(OH)D3+25(OH)D2 SERPL-MCNC: 16.9 NG/ML (ref 30–100)
ALBUMIN SERPL-MCNC: 4.6 G/DL (ref 3.8–4.8)
ALBUMIN/GLOB SERPL: 1.6 {RATIO} (ref 1.2–2.2)
ALP SERPL-CCNC: 81 IU/L (ref 44–121)
ALT SERPL-CCNC: 17 IU/L (ref 0–32)
AST SERPL-CCNC: 29 IU/L (ref 0–40)
BASOPHILS # BLD AUTO: 0 X10E3/UL (ref 0–0.2)
BASOPHILS NFR BLD AUTO: 1 %
BILIRUB SERPL-MCNC: 0.2 MG/DL (ref 0–1.2)
BUN SERPL-MCNC: 11 MG/DL (ref 6–20)
BUN/CREAT SERPL: 17 (ref 9–23)
CALCIUM SERPL-MCNC: 9.5 MG/DL (ref 8.7–10.2)
CHLORIDE SERPL-SCNC: 104 MMOL/L (ref 96–106)
CHOLEST SERPL-MCNC: 233 MG/DL (ref 100–199)
CHOLEST/HDLC SERPL: 6.1 RATIO (ref 0–4.4)
CO2 SERPL-SCNC: 21 MMOL/L (ref 20–29)
CREAT SERPL-MCNC: 0.66 MG/DL (ref 0.57–1)
EOSINOPHIL # BLD AUTO: 0.1 X10E3/UL (ref 0–0.4)
EOSINOPHIL NFR BLD AUTO: 1 %
ERYTHROCYTE [DISTWIDTH] IN BLOOD BY AUTOMATED COUNT: 12.6 % (ref 11.7–15.4)
GLOBULIN SER CALC-MCNC: 2.8 G/DL (ref 1.5–4.5)
GLUCOSE SERPL-MCNC: 104 MG/DL (ref 65–99)
HBA1C MFR BLD: 5.5 % (ref 4.8–5.6)
HCT VFR BLD AUTO: 43.6 % (ref 34–46.6)
HCV AB S/CO SERPL IA: 0.2 S/CO RATIO (ref 0–0.9)
HDLC SERPL-MCNC: 38 MG/DL
HGB BLD-MCNC: 14.9 G/DL (ref 11.1–15.9)
IMM GRANULOCYTES # BLD AUTO: 0 X10E3/UL (ref 0–0.1)
IMM GRANULOCYTES NFR BLD AUTO: 0 %
LDLC SERPL CALC-MCNC: 170 MG/DL (ref 0–99)
LYMPHOCYTES # BLD AUTO: 2.2 X10E3/UL (ref 0.7–3.1)
LYMPHOCYTES NFR BLD AUTO: 30 %
MCH RBC QN AUTO: 29.6 PG (ref 26.6–33)
MCHC RBC AUTO-ENTMCNC: 34.2 G/DL (ref 31.5–35.7)
MCV RBC AUTO: 87 FL (ref 79–97)
MONOCYTES # BLD AUTO: 0.6 X10E3/UL (ref 0.1–0.9)
MONOCYTES NFR BLD AUTO: 8 %
NEUTROPHILS # BLD AUTO: 4.4 X10E3/UL (ref 1.4–7)
NEUTROPHILS NFR BLD AUTO: 60 %
PLATELET # BLD AUTO: 287 X10E3/UL (ref 150–450)
POTASSIUM SERPL-SCNC: 4.1 MMOL/L (ref 3.5–5.2)
PROT SERPL-MCNC: 7.4 G/DL (ref 6–8.5)
RBC # BLD AUTO: 5.03 X10E6/UL (ref 3.77–5.28)
SODIUM SERPL-SCNC: 139 MMOL/L (ref 134–144)
TRIGL SERPL-MCNC: 136 MG/DL (ref 0–149)
TSH SERPL DL<=0.005 MIU/L-ACNC: 1.21 UIU/ML (ref 0.45–4.5)
VLDLC SERPL CALC-MCNC: 25 MG/DL (ref 5–40)
WBC # BLD AUTO: 7.2 X10E3/UL (ref 3.4–10.8)

## 2022-01-27 ENCOUNTER — OFFICE VISIT (OUTPATIENT)
Dept: INTERNAL MEDICINE | Facility: CLINIC | Age: 32
End: 2022-01-27

## 2022-01-27 VITALS
HEIGHT: 67 IN | OXYGEN SATURATION: 98 % | SYSTOLIC BLOOD PRESSURE: 98 MMHG | BODY MASS INDEX: 33.43 KG/M2 | DIASTOLIC BLOOD PRESSURE: 64 MMHG | HEART RATE: 78 BPM | WEIGHT: 213 LBS

## 2022-01-27 DIAGNOSIS — K21.9 GASTROESOPHAGEAL REFLUX DISEASE, UNSPECIFIED WHETHER ESOPHAGITIS PRESENT: ICD-10-CM

## 2022-01-27 DIAGNOSIS — F41.9 ANXIETY AND DEPRESSION: ICD-10-CM

## 2022-01-27 DIAGNOSIS — R19.7 DIARRHEA, UNSPECIFIED TYPE: ICD-10-CM

## 2022-01-27 DIAGNOSIS — Z00.00 HEALTHCARE MAINTENANCE: Primary | ICD-10-CM

## 2022-01-27 DIAGNOSIS — E78.2 MODERATE MIXED HYPERLIPIDEMIA NOT REQUIRING STATIN THERAPY: ICD-10-CM

## 2022-01-27 DIAGNOSIS — F32.A ANXIETY AND DEPRESSION: ICD-10-CM

## 2022-01-27 DIAGNOSIS — K59.00 CONSTIPATION, UNSPECIFIED CONSTIPATION TYPE: ICD-10-CM

## 2022-01-27 DIAGNOSIS — E55.9 VITAMIN D DEFICIENCY: ICD-10-CM

## 2022-01-27 PROCEDURE — 99213 OFFICE O/P EST LOW 20 MIN: CPT | Performed by: NURSE PRACTITIONER

## 2022-01-27 PROCEDURE — 99395 PREV VISIT EST AGE 18-39: CPT | Performed by: NURSE PRACTITIONER

## 2022-01-27 NOTE — PROGRESS NOTES
"Subjective   Bri Jean is a 31 y.o. female and is here for a comprehensive physical exam. The patient reports : c/o occasional stomach pain off and on for about 2 years associated with diarrhea, constipation, nausea, lower abdominal pain, occasional reflux. States that her symptoms seem to be getting worse over the past few months. She has taken Peptobismul for nausea and diarrhea which has helped some. Denies any blood in her stool    Do you take any herbs or supplements that were not prescribed by a doctor? no    Patient is here to follow up on anxiety and depression. She is taking Cymbalta 60 mg each morning and 30 mg each evening which helps her symptoms. Denies any concerns     History:  Has an appointment with GYN soon to have her IUD changed    The following portions of the patient's history were reviewed and updated as appropriate: allergies, current medications, past family history, past medical history, past social history, past surgical history and problem list.    Review of Systems  Do you have pain that bothers you in your daily life? no  Pertinent items are noted in HPI.    Objective   BP 98/64 (BP Location: Left arm, Patient Position: Sitting, Cuff Size: Large Adult)   Pulse 78   Ht 170.2 cm (67\")   Wt 96.6 kg (213 lb)   SpO2 98%   BMI 33.36 kg/m²     General Appearance:    Alert, cooperative, no distress, appears stated age   Head:    Normocephalic, without obvious abnormality, atraumatic   Eyes:    PERRL, conjunctiva/corneas clear, EOM's intact, both eyes   Ears:    Normal TM's and external ear canals, both ears   Nose:   Nares normal, septum midline, mucosa normal, no drainage    or sinus tenderness   Throat:   Lips, mucosa, and tongue normal; teeth and gums normal   Neck:   Supple, symmetrical, trachea midline, no adenopathy;     thyroid:  no enlargement/tenderness/nodules; no carotid    bruit   Back:     Symmetric, no curvature, ROM normal, no CVA tenderness   Lungs:     Clear to " auscultation bilaterally, respirations unlabored   Chest Wall:    No tenderness or deformity    Heart:    Regular rate and rhythm, S1 and S2 normal, no murmur       Abdomen:     Soft, non-tender, bowel sounds active all four quadrants,     no masses, no organomegaly           Extremities:   Extremities normal, atraumatic, no cyanosis or edema   Pulses:   2+ and symmetric all extremities   Skin:   Skin color, texture, turgor normal, no rashes or lesions   Lymph nodes:   Cervical, supraclavicular, and axillary nodes normal   Neurologic:   Grossly intact, normal strength, sensation and reflexes     throughout        No visits with results within 2 Week(s) from this visit.   Latest known visit with results is:   Orders Only on 12/13/2021   Component Date Value Ref Range Status   • WBC 01/20/2022 7.2  3.4 - 10.8 x10E3/uL Final   • RBC 01/20/2022 5.03  3.77 - 5.28 x10E6/uL Final   • Hemoglobin 01/20/2022 14.9  11.1 - 15.9 g/dL Final   • Hematocrit 01/20/2022 43.6  34.0 - 46.6 % Final   • MCV 01/20/2022 87  79 - 97 fL Final   • MCH 01/20/2022 29.6  26.6 - 33.0 pg Final   • MCHC 01/20/2022 34.2  31.5 - 35.7 g/dL Final   • RDW 01/20/2022 12.6  11.7 - 15.4 % Final   • Platelets 01/20/2022 287  150 - 450 x10E3/uL Final   • Neutrophil Rel % 01/20/2022 60  Not Estab. % Final   • Lymphocyte Rel % 01/20/2022 30  Not Estab. % Final   • Monocyte Rel % 01/20/2022 8  Not Estab. % Final   • Eosinophil Rel % 01/20/2022 1  Not Estab. % Final   • Basophil Rel % 01/20/2022 1  Not Estab. % Final   • Neutrophils Absolute 01/20/2022 4.4  1.4 - 7.0 x10E3/uL Final   • Lymphocytes Absolute 01/20/2022 2.2  0.7 - 3.1 x10E3/uL Final   • Monocytes Absolute 01/20/2022 0.6  0.1 - 0.9 x10E3/uL Final   • Eosinophils Absolute 01/20/2022 0.1  0.0 - 0.4 x10E3/uL Final   • Basophils Absolute 01/20/2022 0.0  0.0 - 0.2 x10E3/uL Final   • Immature Granulocyte Rel % 01/20/2022 0  Not Estab. % Final   • Immature Grans Absolute 01/20/2022 0.0  0.0 - 0.1 x10E3/uL  Final   • Glucose 01/20/2022 104* 65 - 99 mg/dL Final   • BUN 01/20/2022 11  6 - 20 mg/dL Final   • Creatinine 01/20/2022 0.66  0.57 - 1.00 mg/dL Final   • eGFR Non  Am 01/20/2022 118  >59 mL/min/1.73 Final   • eGFR African Am 01/20/2022 136  >59 mL/min/1.73 Final    Comment: **In accordance with recommendations from the NKF-ASN Task force,**    [facility] is in the process of updating its eGFR calculation to the    2021 CKD-EPI creatinine equation that estimates kidney function    without a race variable.     • BUN/Creatinine Ratio 01/20/2022 17  9 - 23 Final   • Sodium 01/20/2022 139  134 - 144 mmol/L Final   • Potassium 01/20/2022 4.1  3.5 - 5.2 mmol/L Final   • Chloride 01/20/2022 104  96 - 106 mmol/L Final   • Total CO2 01/20/2022 21  20 - 29 mmol/L Final   • Calcium 01/20/2022 9.5  8.7 - 10.2 mg/dL Final   • Total Protein 01/20/2022 7.4  6.0 - 8.5 g/dL Final   • Albumin 01/20/2022 4.6  3.8 - 4.8 g/dL Final   • Globulin 01/20/2022 2.8  1.5 - 4.5 g/dL Final   • A/G Ratio 01/20/2022 1.6  1.2 - 2.2 Final   • Total Bilirubin 01/20/2022 0.2  0.0 - 1.2 mg/dL Final   • Alkaline Phosphatase 01/20/2022 81  44 - 121 IU/L Final   • AST (SGOT) 01/20/2022 29  0 - 40 IU/L Final   • ALT (SGPT) 01/20/2022 17  0 - 32 IU/L Final   • Total Cholesterol 01/20/2022 233* 100 - 199 mg/dL Final   • Triglycerides 01/20/2022 136  0 - 149 mg/dL Final   • HDL Cholesterol 01/20/2022 38* >39 mg/dL Final   • VLDL Cholesterol Marcial 01/20/2022 25  5 - 40 mg/dL Final   • LDL Chol Calc (NIH) 01/20/2022 170* 0 - 99 mg/dL Final   • Chol/HDL Ratio 01/20/2022 6.1* 0.0 - 4.4 ratio Final    Comment:                                   T. Chol/HDL Ratio                                              Men  Women                                1/2 Avg.Risk  3.4    3.3                                    Avg.Risk  5.0    4.4                                 2X Avg.Risk  9.6    7.1                                 3X Avg.Risk 23.4   11.0     • TSH 01/20/2022  1.210  0.450 - 4.500 uIU/mL Final   • Hemoglobin A1C 01/20/2022 5.5  4.8 - 5.6 % Final    Comment:          Prediabetes: 5.7 - 6.4           Diabetes: >6.4           Glycemic control for adults with diabetes: <7.0     • 25 Hydroxy, Vitamin D 01/20/2022 16.9* 30.0 - 100.0 ng/mL Final    Comment: Vitamin D deficiency has been defined by the [place] [employer]  [employer] and an Endocrine Society practice guideline as a  level of serum 25-OH vitamin D less than 20 ng/mL (1,2).  The Endocrine Society went on to further define vitamin D  insufficiency as a level between 21 and 29 ng/mL (2).  1. IOM ([place] [employer]). 2010. Dietary reference     intakes for calcium and D. [place]: The     National Academies Press.  2. Wolf MF, Jake REBOLLEDO, Dante WHITMORE, et al.     Evaluation, treatment, and prevention of vitamin D     deficiency: an Endocrine Society clinical practice     guideline. JCEM. 2011 Jul; 96(7):1911-30.     • Hep C Virus Ab 01/20/2022 0.2  0.0 - 0.9 s/co ratio Final    Comment:                                   Negative:     < 0.8                               Indeterminate: 0.8 - 0.9                                    Positive:     > 0.9   The CDC recommends that a positive HCV antibody result   be followed up with a HCV Nucleic Acid Amplification   test (695529).       Reviewed labs with patient.     Assessment/Plan   Healthy female exam.      1. Diagnoses and all orders for this visit:    1. Healthcare maintenance (Primary)    2. Vitamin D deficiency    3. Diarrhea, unspecified type  -     Ambulatory Referral to Gastroenterology    4. Constipation, unspecified constipation type  -     Ambulatory Referral to Gastroenterology    5. Gastroesophageal reflux disease, unspecified whether esophagitis present  -     Ambulatory Referral to Gastroenterology    6. Anxiety and depression    7. Moderate mixed hyperlipidemia not requiring statin therapy    vit d deficiency - Instructed patient to take vit D3  2000 IU daily.     IBS - patient may have IBS. Will refer to GI    HLD - Patient's 10-year cardiovascular risk score is 1.33%. She will improve her diet and exercise      Current Outpatient Medications:   •  Aspirin-Acetaminophen-Caffeine (EXCEDRIN PO), Take 1 tablet by mouth As Needed., Disp: , Rfl:   •  DULoxetine (CYMBALTA) 30 MG capsule, Take 1 capsule by mouth Daily., Disp: 90 capsule, Rfl: 2  •  DULoxetine (CYMBALTA) 60 MG capsule, Take 1 capsule by mouth Daily., Disp: 90 capsule, Rfl: 3  •  Levonorgestrel (FABIAN) 13.5 MG intrauterine device IUD, 1 each by Intrauterine route 1 (One) Time., Disp: , Rfl:     2. Patient Counseling:  --Nutrition: Stressed importance of moderation in sodium/caffeine intake, saturated fat and cholesterol, caloric balance, sufficient intake of fresh fruits, vegetables, fiber, calcium, iron.  --Exercise: Stressed the importance of regular exercise.   --Injury prevention: Discussed safety belts, safety helmets, smoke detector.   --Dental health: Discussed importance of regular tooth brushing, flossing, and dental visits.  --Immunizations reviewed.    3. Follow up next physical in 1 year

## 2022-02-15 ENCOUNTER — OFFICE VISIT (OUTPATIENT)
Dept: GASTROENTEROLOGY | Facility: CLINIC | Age: 32
End: 2022-02-15

## 2022-02-15 VITALS — TEMPERATURE: 97.2 F | WEIGHT: 209.8 LBS | HEIGHT: 67 IN | BODY MASS INDEX: 32.93 KG/M2

## 2022-02-15 DIAGNOSIS — R19.7 DIARRHEA, UNSPECIFIED TYPE: ICD-10-CM

## 2022-02-15 DIAGNOSIS — R19.4 CHANGE IN BOWEL HABITS: ICD-10-CM

## 2022-02-15 DIAGNOSIS — K62.5 RECTAL BLEEDING: ICD-10-CM

## 2022-02-15 DIAGNOSIS — R10.84 GENERALIZED ABDOMINAL PAIN: Primary | ICD-10-CM

## 2022-02-15 DIAGNOSIS — R11.0 NAUSEA: ICD-10-CM

## 2022-02-15 PROCEDURE — 99214 OFFICE O/P EST MOD 30 MIN: CPT | Performed by: NURSE PRACTITIONER

## 2022-02-15 RX ORDER — DICYCLOMINE HYDROCHLORIDE 10 MG/1
10 CAPSULE ORAL 3 TIMES DAILY PRN
Qty: 60 CAPSULE | Refills: 3 | Status: SHIPPED | OUTPATIENT
Start: 2022-02-15

## 2022-02-15 NOTE — PROGRESS NOTES
"Chief Complaint   Patient presents with   • Abdominal Pain       HPI    Bri Jean is a  31 y.o. female here to establish care as a new patient for diarrhea and abdominal pain.    This patient will also follow with Dr. Cotter.    Patient reports 2 years of fluctuant bowel pattern alternating between small volume stools and episodes of diarrhea with generalized abdominal discomfort manifesting as a cramping aching sensation.  She has episodes of \"flareup\" about twice a month with worsening of baseline symptoms.  Intermittent bright red blood per rectum which she attributes to internal hemorrhoids.    Frequent nausea but no vomiting.  Occasional heartburn but she does not find heartburn worrisome.  No dysphagia, no odynophagia, poor appetite, weight loss.  BMI 32.86.    Recent labs normal hemogram, LFTs, and TSH.    She still has her gallbladder.    No family history of colon cancer or inflammatory bowel disease.    Past Medical History:   Diagnosis Date   • Anxiety    • Depression    • Headache        Past Surgical History:   Procedure Laterality Date   • BACK SURGERY      fusion L4-L5, L5-S1   • BACK SURGERY      discectomy -L4-L5       Scheduled Meds:     Continuous Infusions: No current facility-administered medications for this visit.      PRN Meds:     Allergies   Allergen Reactions   • Hydrocodone Nausea And Vomiting   • Morphine And Related Hives   • Morphine Hives       Social History     Socioeconomic History   • Marital status: Single   Tobacco Use   • Smoking status: Former Smoker     Quit date: 2016     Years since quittin.6   • Smokeless tobacco: Never Used   Substance and Sexual Activity   • Alcohol use: Yes     Comment: 2/MONTH    • Drug use: No       Family History   Problem Relation Age of Onset   • Depression Mother    • Thyroid disease Father    • Breast cancer Maternal Aunt    • Kidney disease Maternal Grandmother    • Diabetes Maternal Grandmother    • Heart attack Maternal " Grandmother    • Stroke Maternal Grandfather    • Emphysema Maternal Grandfather    • Depression Paternal Grandmother    • Alcohol abuse Paternal Grandfather        Review of Systems   Constitutional: Negative for activity change, appetite change, fatigue and unexpected weight change.   HENT: Negative for trouble swallowing.    Eyes: Negative.    Respiratory: Negative.    Cardiovascular: Negative.    Gastrointestinal: Positive for abdominal pain, anal bleeding, diarrhea and nausea. Negative for abdominal distention, blood in stool, constipation, rectal pain and vomiting.   Endocrine: Negative.    Genitourinary: Negative.    Musculoskeletal: Negative.    Allergic/Immunologic: Negative.    Neurological: Negative.    Hematological: Negative.    Psychiatric/Behavioral: Negative.        Vitals:    02/15/22 1415   Temp: 97.2 °F (36.2 °C)       Physical Exam  Constitutional:       Appearance: She is well-developed.   Abdominal:      General: Bowel sounds are normal. There is no distension.      Palpations: Abdomen is soft. There is no mass.      Tenderness: There is no abdominal tenderness. There is no guarding.      Hernia: No hernia is present.   Skin:     General: Skin is warm and dry.      Capillary Refill: Capillary refill takes less than 2 seconds.   Neurological:      Mental Status: She is alert and oriented to person, place, and time.   Psychiatric:         Behavior: Behavior normal.     Assessment    Diagnoses and all orders for this visit:    1. Generalized abdominal pain (Primary)  -     Case Request; Standing  -     Case Request  -     C-reactive Protein  -     Sedimentation Rate  -     Celiac Comprehensive Panel  -     Food Allergy Profile    2. Change in bowel habits  -     Case Request; Standing  -     Case Request  -     C-reactive Protein  -     Sedimentation Rate  -     Celiac Comprehensive Panel  -     Food Allergy Profile    3. Diarrhea, unspecified type  -     Case Request; Standing  -     Case  Request  -     C-reactive Protein  -     Sedimentation Rate  -     Celiac Comprehensive Panel  -     Food Allergy Profile    4. Nausea  -     Case Request; Standing  -     Case Request    5. Rectal bleeding  -     Case Request; Standing  -     Case Request  -     C-reactive Protein  -     Sedimentation Rate  -     Celiac Comprehensive Panel  -     Food Allergy Profile    Other orders  -     dicyclomine (Bentyl) 10 MG capsule; Take 1 capsule by mouth 3 (Three) Times a Day As Needed (Abdominal cramps and diarrhea).  Dispense: 60 capsule; Refill: 3       Plan    Recommend bidirectional endoscopic examination to further evaluate the above-mentioned symptoms rule out celiac disease, inflammatory bowel disease, establish the presence of irritable bowel syndrome.  Additional labs today as above.    Trial of Bentyl antispasmodic in the interim.  Further recommendations and follow-up pending aforementioned work-up.         KELLY Mendiola  [facility]  [address] [place] [address]  Office: [phone]

## 2022-02-16 LAB
CRP SERPL-MCNC: 11 MG/L (ref 0–10)
ENDOMYSIUM IGA SER QL: NEGATIVE
ERYTHROCYTE [SEDIMENTATION RATE] IN BLOOD BY WESTERGREN METHOD: 32 MM/HR (ref 0–32)
GLIADIN PEPTIDE IGA SER-ACNC: 4 UNITS (ref 0–19)
GLIADIN PEPTIDE IGG SER-ACNC: 2 UNITS (ref 0–19)
IGA SERPL-MCNC: 258 MG/DL (ref 87–352)
TTG IGA SER-ACNC: <2 U/ML (ref 0–3)
TTG IGG SER-ACNC: <2 U/ML (ref 0–5)

## 2022-02-20 LAB
CLAM IGE QN: <0.1 KU/L
CODFISH IGE QN: <0.1 KU/L
CONV CLASS DESCRIPTION: ABNORMAL
CORN IGE QN: <0.1 KU/L
COW MILK IGE QN: 0.27 KU/L
EGG WHITE IGE QN: <0.1 KU/L
PEANUT IGE QN: <0.1 KU/L
SCALLOP IGE QN: <0.1 KU/L
SESAME SEED IGE QN: <0.1 KU/L
SHRIMP IGE QN: <0.1 KU/L
SOYBEAN IGE QN: <0.1 KU/L
WALNUT IGE QN: <0.1 KU/L
WHEAT IGE QN: 0.19 KU/L

## 2022-02-21 ENCOUNTER — TELEPHONE (OUTPATIENT)
Dept: GASTROENTEROLOGY | Facility: CLINIC | Age: 32
End: 2022-02-21

## 2022-02-21 NOTE — PROGRESS NOTES
Please inform the patient that one of her inflammatory markers is a couple points above normal.  This is a nonspecific indicator of inflammation but may be related to colonic inflammation.  Await endoscopic findings.  Food allergy profile shows a low sensitivity to cows milk and wheat.  Consider lactose-free diet first.

## 2022-02-21 NOTE — TELEPHONE ENCOUNTER
----- Message from KELLY Mendiola sent at 2/21/2022  3:18 PM EST -----  Please inform the patient that one of her inflammatory markers is a couple points above normal.  This is a nonspecific indicator of inflammation but may be related to colonic inflammation.  Await endoscopic findings.  Food allergy profile shows a low   sensitivity to cows milk and wheat.  Consider lactose-free diet first.

## 2022-05-05 ENCOUNTER — TELEPHONE (OUTPATIENT)
Dept: GASTROENTEROLOGY | Facility: CLINIC | Age: 32
End: 2022-05-05

## 2022-05-05 NOTE — TELEPHONE ENCOUNTER
PT does not have any insurance at this time and wants to cancel until further notice    PT has been placed into the [facility]ae

## 2022-08-31 ENCOUNTER — PATIENT MESSAGE (OUTPATIENT)
Dept: INTERNAL MEDICINE | Facility: CLINIC | Age: 32
End: 2022-08-31

## 2022-08-31 DIAGNOSIS — F32.A ANXIETY AND DEPRESSION: ICD-10-CM

## 2022-08-31 DIAGNOSIS — F41.9 ANXIETY AND DEPRESSION: ICD-10-CM

## 2022-09-01 RX ORDER — DULOXETIN HYDROCHLORIDE 30 MG/1
30 CAPSULE, DELAYED RELEASE ORAL DAILY
Qty: 90 CAPSULE | Refills: 1 | Status: SHIPPED | OUTPATIENT
Start: 2022-09-01

## 2022-09-01 RX ORDER — DULOXETIN HYDROCHLORIDE 60 MG/1
60 CAPSULE, DELAYED RELEASE ORAL DAILY
Qty: 90 CAPSULE | Refills: 1 | Status: SHIPPED | OUTPATIENT
Start: 2022-09-01

## 2022-09-01 NOTE — TELEPHONE ENCOUNTER
From: Bri Jean  To: KELLY Roque  Sent: 8/31/2022 12:07 PM EDT  Subject: Cymbalta    Hello- I wanted to reach out and see if I could get a refill on my Cymbalta? I know I am scheduled to come in soon, but I am in a new position where benefits are not yet available so I have no insurance at this time. If I don't get my Cymbalta I worry very much about the side effects because skipping just one day throws me in a loop. Once I am insured I plan to come in asap! I have been doing well, losing weight/exercising. Just worried about my medication withdrawals.  I contacted the pharmacy to see my out of pocket cost for the meds and they cant tell me until another refill is added. Would this be ok?  Thank you.

## 2024-03-22 ENCOUNTER — OFFICE VISIT (OUTPATIENT)
Dept: FAMILY MEDICINE CLINIC | Facility: CLINIC | Age: 34
End: 2024-03-22
Payer: COMMERCIAL

## 2024-03-22 VITALS
OXYGEN SATURATION: 99 % | WEIGHT: 182 LBS | HEIGHT: 67 IN | SYSTOLIC BLOOD PRESSURE: 127 MMHG | HEART RATE: 78 BPM | BODY MASS INDEX: 28.56 KG/M2 | DIASTOLIC BLOOD PRESSURE: 85 MMHG

## 2024-03-22 DIAGNOSIS — F32.A ANXIETY AND DEPRESSION: ICD-10-CM

## 2024-03-22 DIAGNOSIS — G43.109 MIGRAINE WITH AURA AND WITHOUT STATUS MIGRAINOSUS, NOT INTRACTABLE: Primary | ICD-10-CM

## 2024-03-22 DIAGNOSIS — E55.9 VITAMIN D DEFICIENCY: ICD-10-CM

## 2024-03-22 DIAGNOSIS — E78.2 MODERATE MIXED HYPERLIPIDEMIA NOT REQUIRING STATIN THERAPY: ICD-10-CM

## 2024-03-22 DIAGNOSIS — F41.9 ANXIETY AND DEPRESSION: ICD-10-CM

## 2024-03-22 DIAGNOSIS — Z00.00 ANNUAL PHYSICAL EXAM: ICD-10-CM

## 2024-03-22 DIAGNOSIS — R73.01 ELEVATED FASTING GLUCOSE: ICD-10-CM

## 2024-03-22 PROBLEM — R10.84 GENERALIZED ABDOMINAL PAIN: Status: RESOLVED | Noted: 2022-02-15 | Resolved: 2024-03-22

## 2024-03-22 PROBLEM — R19.7 DIARRHEA: Status: RESOLVED | Noted: 2022-02-15 | Resolved: 2024-03-22

## 2024-03-22 PROBLEM — K62.5 RECTAL BLEEDING: Status: RESOLVED | Noted: 2022-02-15 | Resolved: 2024-03-22

## 2024-03-22 PROBLEM — R11.0 NAUSEA: Status: RESOLVED | Noted: 2022-02-15 | Resolved: 2024-03-22

## 2024-03-22 RX ORDER — DULOXETIN HYDROCHLORIDE 30 MG/1
30 CAPSULE, DELAYED RELEASE ORAL DAILY
Qty: 90 CAPSULE | Refills: 1 | Status: SHIPPED | OUTPATIENT
Start: 2024-03-22

## 2024-03-22 RX ORDER — ONDANSETRON 4 MG/1
4 TABLET, ORALLY DISINTEGRATING ORAL EVERY 8 HOURS PRN
Qty: 30 TABLET | Refills: 1 | Status: SHIPPED | OUTPATIENT
Start: 2024-03-22

## 2024-03-22 RX ORDER — SUMATRIPTAN 50 MG/1
50 TABLET, FILM COATED ORAL
Qty: 30 TABLET | Refills: 1 | Status: SHIPPED | OUTPATIENT
Start: 2024-03-22

## 2024-03-22 NOTE — ASSESSMENT & PLAN NOTE
-  Not currently with SI/HI.    - Strongly advised counseling.    -Continue Cymbalta at this time.    - Patient has not DC'd Cymbalta for more than 3 days due to significant side effects with med taper, encouraged patient to take some time off work and give her body at least a week.  Will give Zofran for nausea associated with med cessation.  - AVS provided information on nonpharmacologic coping strategies.  - Discussed going to ER immediately if SI/HI develop.

## 2024-03-22 NOTE — ASSESSMENT & PLAN NOTE
Elevated cholesterol on 2022 labs. No red flags on history or exam.  Repeat lipid pending.  Discussed diet and lifestyle modifications.

## 2024-03-22 NOTE — ASSESSMENT & PLAN NOTE
Likely migraine with aura based on characteristics, and presence of nausea/vomiting and photo/phonophobia. Frequency currently at 1x monthly  - No red flag symptoms at this time for advanced imaging.   - encouraged pt to keep headache diary to identify potential triggers  - discussed nonpharmacologic strategies: yoga, stretching, journaling, heat, epsom salt bath  - cont NSAIDs as first line treatment as this has proven effective.   - abortive: Imitrex 50mg used at start of HA; (MAX 2/DAY 8/MONTH)  -Zofran 4 mg as needed nausea  - Discussed use of B2 or magnesium 400mg daily for prophylaxis; requires 3-4 mo for improvement to be felt.   - can make appt for acupuncture

## 2024-03-22 NOTE — PROGRESS NOTES
Chief Complaint  Chief Complaint   Patient presents with    Establish Care    Headache    Anxiety       Subjective    History of Present Illness  Bri Jean is a 33 y.o. female presents to [facility] to establish care.  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 She has some back issues which occasionally make it hard to sleep. She does not snore.   Mood: Describes herself as quiet, introverted, chill person. She is taking cymbalta- has not been taking regularly because she has been in between doctors. She doesn't like how she feels when she misses a dose- she gets nausea, head zaps, anxiety. She would like to stop taking Cymbalta.   Health goals: Has a derm appt, not sure if she needs a referral, for facial skin and skin check.   She has migraines with aura- associated with vomiting, light/sound sensitivity. Currently managed using OTC meds. She tried multiple prescription meds when she was younger- topamax, growth hormone, propranolol. Currently gets 1 migraine/month, but will take her out of activities for 2 days.   She has had 2 back surgeries, with occasional flares.    Current Outpatient Medications on File Prior to Visit   Medication Sig Dispense Refill    Aspirin-Acetaminophen-Caffeine (EXCEDRIN PO) Take 1 tablet by mouth As Needed.      Levonorgestrel (FABIAN) 13.5 MG intrauterine device IUD 1 each by Intrauterine route 1 (One) Time.      [DISCONTINUED] DULoxetine (CYMBALTA) 30 MG capsule Take 1 capsule by mouth Daily. 90 capsule 1    [DISCONTINUED] dicyclomine (Bentyl) 10 MG capsule  Take 1 capsule by mouth 3 (Three) Times a Day As Needed (Abdominal cramps and diarrhea). (Patient not taking: Reported on 3/22/2024) 60 capsule 3    [DISCONTINUED] DULoxetine (CYMBALTA) 60 MG capsule Take 1 capsule by mouth Daily. (Patient not taking: Reported on 3/22/2024) 90 capsule 1     No current facility-administered medications on file prior to visit.       Patient Active Problem List   Diagnosis    Chronic bilateral low back pain with bilateral sciatica    Herniation of lumbar intervertebral disc with radiculopathy    Anxiety and depression    Vitamin D deficiency    Moderate mixed hyperlipidemia not requiring statin therapy    Elevated fasting glucose    Thoracic sprain    Migraine with aura and without status migrainosus, not intractable       Past Medical History:   Diagnosis Date    Anxiety     Depression     Diarrhea 02/15/2022    Added automatically from request for surgery [id number]     >>OVERVIEW FOR CHANGE IN BOWEL HABITS WRITTEN ON 2/15/2022  2:45 PM BY TONG THOMPSON APRN     Added automatically from request for surgery [id number]      Generalized abdominal pain 02/15/2022    Added automatically from request for surgery [id number]      Headache     Nausea 02/15/2022    Added automatically from request for surgery [id number]      Rectal bleeding 02/15/2022    Added automatically from request for surgery [id number]         Past Surgical History:   Procedure Laterality Date    BACK SURGERY  2016    fusion L4-L5, L5-S1    BACK SURGERY      discectomy 2008-L4-L5    WISDOM TOOTH EXTRACTION  2019       Family History   Problem Relation Age of Onset    Depression Mother     Thyroid disease Father     Breast cancer Maternal Aunt 55    Kidney disease Maternal Grandmother     Diabetes Maternal Grandmother     Heart attack Maternal Grandmother     Stroke Maternal Grandfather     Emphysema Maternal Grandfather     Depression Paternal Grandmother     Alcohol abuse Paternal Grandfather        Health Maintenance Summary             Ordered - LIPID PANEL (Yearly) Ordered on 3/22/2024      01/20/2022  Lipid Panel With / Chol / HDL Ratio    12/11/2020  Lipid Panel With / Chol / HDL Ratio    03/04/2019  Lipid Panel With LDL / HDL Ratio              Postponed - COVID-19 Vaccine (3 - 2023-24 season) Postponed until 3/24/2024      03/22/2024  Postponed until 3/24/2024 by Kelsey Navarrete MD (Patient Refused)    08/12/2021  Imm Admin: COVID-19 (PFIZER) PURPLE CAP    07/22/2021  Imm Admin: COVID-19 (PFIZER) Purple Cap Monovalent              Postponed - INFLUENZA VACCINE (Yearly - August to March) Postponed until 3/31/2024      03/22/2024  Postponed until 3/31/2024 by Kelsey Navarrete MD (Product Unavailable)    11/15/2021  Imm Admin: Flu Vaccine Quad PF >36MO    11/14/2020  Done    11/05/2020  Imm Admin: Flublok 18+yrs    11/01/2018  Patient-Reported (Performed Externally)    Only the first 5 history entries have been loaded, but more history exists.              Postponed - PAP SMEAR (Every 3 Years) Postponed until 5/14/2024 03/22/2024  Postponed until 5/14/2024 by Kelsey Navarrete MD (Pending event)    03/01/2021  Patient-Reported (Performed Externally)    09/01/2018  Patient-Reported (Performed Externally) - NORMAL              ANNUAL PHYSICAL (Yearly) Next due on 3/22/2025      03/22/2024  Done    01/27/2022  Registry Metric: Last Annual Physical              BMI FOLLOWUP (Yearly) Next due on 3/22/2025      03/22/2024  Postponed until 3/22/2024 by Kelsey Navarrete MD (Not Indicated)    03/22/2024  [employer]: WORKFLOW - QUALITY MEASUREMENT - DOCUMENTED WEIGHT FOLLOW-UP PLAN    03/07/2019  SmartData: WORKFLOW - QUALITY MEASUREMENT - BMI FOLLOW UP CARE PLAN DOCUMENTED              TDAP/TD VACCINES (2 - Td or Tdap) Next due on 3/7/2029      03/07/2019  Imm Admin: Tdap              HEPATITIS C SCREENING  Completed      01/20/2022  Hep C Virus Ab component of Hepatitis C Antibody              Pneumococcal Vaccine 0-64 (Series Information) Aged Out  "     No completion, postpone, or frequency change history exists for this topic.                       Objective   Vitals:    03/22/24 1436   BP: 127/85   Pulse: 78   SpO2: 99%   Weight: 82.6 kg (182 lb)   Height: 170.2 cm (67.01\")        BMI is >= 25 and <30. (Overweight) The following options were offered after discussion;: information on healthy weight added to patient's after visit summary        Physical Exam  Vitals reviewed.   Constitutional:       General: She is not in acute distress.     Appearance: Normal appearance.   HENT:      Head: Normocephalic and atraumatic.      Right Ear: Tympanic membrane, ear canal and external ear normal.      Left Ear: Tympanic membrane, ear canal and external ear normal.      Nose: Nose normal. No rhinorrhea.      Mouth/Throat:      Mouth: Mucous membranes are moist.      Pharynx: No posterior oropharyngeal erythema.   Eyes:      Extraocular Movements: Extraocular movements intact.      Conjunctiva/sclera: Conjunctivae normal.      Pupils: Pupils are equal, round, and reactive to light.   Neck:      Comments: No thyromegaly or thyroid nodule on palpation  Cardiovascular:      Rate and Rhythm: Normal rate and regular rhythm.      Pulses: Normal pulses.      Heart sounds: Normal heart sounds. No murmur heard.  Pulmonary:      Effort: Pulmonary effort is normal.      Breath sounds: Normal breath sounds. No wheezing.   Abdominal:      General: Bowel sounds are normal. There is no distension.      Palpations: Abdomen is soft.      Tenderness: There is no abdominal tenderness.   Musculoskeletal:         General: No tenderness or deformity. Normal range of motion.      Cervical back: Normal range of motion and neck supple.   Lymphadenopathy:      Cervical: No cervical adenopathy.   Skin:     General: Skin is warm and dry.      Capillary Refill: Capillary refill takes less than 2 seconds.      Findings: No lesion or rash.   Neurological:      General: No focal deficit present.      " Mental Status: She is alert and oriented to person, place, and time.      Gait: Gait normal.      Deep Tendon Reflexes: Reflexes normal.   Psychiatric:         Mood and Affect: Mood normal.         Behavior: Behavior normal.         Judgment: Judgment normal.          PHQ-9 Depression Screening  Little interest or pleasure in doing things? 0-->not at all   Feeling down, depressed, or hopeless? 0-->not at all   Trouble falling or staying asleep, or sleeping too much?     Feeling tired or having little energy?     Poor appetite or overeating?     Feeling bad about yourself - or that you are a failure or have let yourself or your family down?     Trouble concentrating on things, such as reading the newspaper or watching television?     Moving or speaking so slowly that other people could have noticed? Or the opposite - being so fidgety or restless that you have been moving around a lot more than usual?     Thoughts that you would be better off dead, or of hurting yourself in some way?     PHQ-9 Total Score 0   If you checked off any problems, how difficult have these problems made it for you to do your work, take care of things at home, or get along with other people?       GILDA-7 (General Anxiety Disorder-7) from [place]  on 3/22/2024  ** All calculations should be rechecked by clinician prior to use **    RESULT SUMMARY:  5 points  Mild anxiety disorder.    Functionally, the patient does not report limitations due to their symptoms.      INPUTS:  Feeling nervous, anxious, or on edge --> 1 = Several days  Not being able to stop or control worrying --> 1 = Several days  Worrying too much about different things --> 1 = Several days  Trouble relaxing --> 0 = Not at all  Being so restless that it's hard to sit still --> 0 = Not at all  Becoming easily annoyed or irritable --> 1 = Several days  Feeling afraid as if something awful might happen --> 1 = Several days  Ask the patient: how difficult have these problems made  it to do work, take care of things at home, or get along with other people? --> 0 = Not at all      The following data was reviewed by: Kelsey Navarrete MD on 03/22/2024:  2022- celiac panel, vitamin D,  ESR, CRP, hemoglobin A1c, TSH, lipid, CMP, CBC  2023 primary care note      Assessment and Plan  Bri Jean is a 33 y.o. female presents to [facility] today to establish care, chart reviewed and updated, medications reviewed, labs reviewed, preventative med reviewed. Patient has not been seen by primary care for annual exam in the last 12 months.. Answered all questions at this time, pt expressed no further concerns today.     Preventative medicine:   Eye exam: Up to date.    Dental exam: Up to date.    BP: normal  Lipid Panel :   Ordered    A1c: Ordered   Hep C screening: Negative  HIV Screen UTD - N/A  STI screening UTD: N/A  Colon cancer screening UTD: N/A  Lung cancer screening UTD: N/A  Immunizations UTD: No-declined COVID-vaccine  Anxiety/depression screening: normal  Tobacco cessation counseling: Yes-shared decision making, will defer tobacco cessation until she completes Cymbalta wean to minimize negative side effects.    Women:   Pap: Up to date.  normal  Mammogram:  Age 40    Osteoporosis Screen:  Age 65      Diagnoses and all orders for this visit:    1. Migraine with aura and without status migrainosus, not intractable (Primary)  Assessment & Plan:  Likely migraine with aura based on characteristics, and presence of nausea/vomiting and photo/phonophobia. Frequency currently at 1x monthly  - No red flag symptoms at this time for advanced imaging.   - encouraged pt to keep headache diary to identify potential triggers  - discussed nonpharmacologic strategies: yoga, stretching, journaling, heat, epsom salt bath  - cont NSAIDs as first line treatment as this has proven effective.   - abortive: Imitrex 50mg used at start of HA; (MAX 2/DAY 8/MONTH)  -Zofran 4 mg as needed  nausea  - Discussed use of B2 or magnesium 400mg daily for prophylaxis; requires 3-4 mo for improvement to be felt.   - can make appt for acupuncture    Orders:  -     ondansetron ODT (ZOFRAN-ODT) 4 MG disintegrating tablet; Place 1 tablet on the tongue Every 8 (Eight) Hours As Needed for Nausea or Vomiting. Indications: Nausea and Vomiting  Dispense: 30 tablet; Refill: 1  -     SUMAtriptan (Imitrex) 50 MG tablet; Take 1 tablet by mouth Every 2 (Two) Hours As Needed for Migraine. Take one tablet at onset of headache. May repeat dose one time in 2 hours if headache not relieved.  Indications: Migraine Headache  Dispense: 30 tablet; Refill: 1    2. Anxiety and depression  Assessment & Plan:  -  Not currently with SI/HI.    - Strongly advised counseling.    -Continue Cymbalta at this time.    - Patient has not DC'd Cymbalta for more than 3 days due to significant side effects with med taper, encouraged patient to take some time off work and give her body at least a week.  Will give Zofran for nausea associated with med cessation.  - AVS provided information on nonpharmacologic coping strategies.  - Discussed going to ER immediately if SI/HI develop.     Orders:  -     CBC & Differential  -     Vitamin D,25-Hydroxy  -     TSH Rfx On Abnormal To Free T4  -     Ferritin  -     DULoxetine (CYMBALTA) 30 MG capsule; Take 1 capsule by mouth Daily.  Dispense: 90 capsule; Refill: 1    3. Moderate mixed hyperlipidemia not requiring statin therapy  Assessment & Plan:  Elevated cholesterol on 2022 labs. No red flags on history or exam.  Repeat lipid pending.  Discussed diet and lifestyle modifications.        Orders:  -     Comprehensive Metabolic Panel  -     Lipid Panel    4. Elevated fasting glucose  -     Comprehensive Metabolic Panel  -     Hemoglobin A1c    5. Vitamin D deficiency  Assessment & Plan:  Low vitamin D on 2022 labs.  Repeat vitamin D level pending.      6. Annual physical exam        Patient voiced understanding  and agreement with plan of care and had no further questions or concerns at this time.     I spent 34 minutes on this encounter, including chart review of any relevant previous encounters, labs, and imaging;  >%50% of encounter spent face-to-face with the patient or coordinating care.    Kelsey Navarrete MD  Family Medicine  [facility]    Follow Up  Return in about 3 months (around 6/22/2024) for Recheck.    Patient Instructions   Today: Update screening labs. PCP can manage routine gyn if desired.     Meds: Cont cymbalta 30mg- schedule a week off work and try weaning off med  Zofran 4mg ODT 3x daily as needed for nausea  Imitrex 50mg at first symptom of migraine; may repeat in 2 hrs if needed    Referrals: Let me know if you need a derm referral    Healthy lifestyle tips  - Reduce intake of processed food (shop perimeter of grocery store), especially white flour and sugar  - Increase intake of fruits/veggies  - Drink 32-64oz of water a day  - Quit smoking if you smoke  - Drink no more than 2 alcoholic beverages/day if you are male, no more than 1 alcoholic beverage/day if you are female  - Get 6-8 hours of restful sleep at night- poor sleep quality has been linked to increased risk of cardiovascular disease  - Voluntary physical activity cumulative 120-150 minutes/week  - Stress management utilizing meditation and mindfulness (InsightTimer, free saida)  - Keep blood pressure < 140/90    Heart healthy diet from AHA: https://www.heart.org/en/healthy-living/healthy-eating/eat-smart/nutrition-basics/aha-diet-and-lifestyle-recommendations    MH plan:  - Start counseling if not currently established  - Meditation: check out Insight Timer (free saida)  - Sleep hygiene  - Increase physical activity as tolerated- goal 120mins/week  - Acupressure  - Supplements: magnesium, ashwaganda  - May make appt for Dr. Navarrete's acupuncture clinic if desired- Tuesdays, cash pay  - If you experience any thoughts of harming  yourself or someone else, please go to the ER immediately.     Resources:  https://www.apa.org/topics/anxiety/    https://www.apa.org/topics/depression/    https://sleepeducation.org/healthy-sleep/healthy-sleep-habits/    [email or web address]://[email or web address]-care/[email or web address]/acupressure-stress-and-anxiety

## 2024-03-22 NOTE — PATIENT INSTRUCTIONS
Today: Update screening labs. PCP can manage routine gyn if desired.     Meds: Cont cymbalta 30mg- schedule a week off work and try weaning off med  Zofran 4mg ODT 3x daily as needed for nausea  Imitrex 50mg at first symptom of migraine; may repeat in 2 hrs if needed    Referrals: Let me know if you need a derm referral    Healthy lifestyle tips  - Reduce intake of processed food (shop perimeter of grocery store), especially white flour and sugar  - Increase intake of fruits/veggies  - Drink 32-64oz of water a day  - Quit smoking if you smoke  - Drink no more than 2 alcoholic beverages/day if you are male, no more than 1 alcoholic beverage/day if you are female  - Get 6-8 hours of restful sleep at night- poor sleep quality has been linked to increased risk of cardiovascular disease  - Voluntary physical activity cumulative 120-150 minutes/week  - Stress management utilizing meditation and mindfulness (InsightTimer, free saida)  - Keep blood pressure < 140/90    Heart healthy diet from AHA: https://www.heart.org/en/healthy-living/healthy-eating/eat-smart/nutrition-basics/aha-diet-and-lifestyle-recommendations    MH plan:  - Start counseling if not currently established  - Meditation: check out Insight Timer (free saida)  - Sleep hygiene  - Increase physical activity as tolerated- goal 120mins/week  - Acupressure  - Supplements: magnesium, ashwaganda  - May make appt for Dr. Navarrete's acupuncture clinic if desired- Tuesdays, cash pay  - If you experience any thoughts of harming yourself or someone else, please go to the ER immediately.     Resources:  https://www.apa.org/topics/anxiety/    https://www.apa.org/topics/depression/    https://sleepeducation.org/healthy-sleep/healthy-sleep-habits/    https://[email or web address]/cancer-care/patient-education/acupressure-stress-and-anxiety

## 2024-03-23 LAB
25(OH)D3+25(OH)D2 SERPL-MCNC: 15.7 NG/ML (ref 30–100)
ALBUMIN SERPL-MCNC: 4.9 G/DL (ref 3.5–5.2)
ALBUMIN/GLOB SERPL: 2 G/DL
ALP SERPL-CCNC: 58 U/L (ref 39–117)
ALT SERPL-CCNC: 13 U/L (ref 1–33)
AST SERPL-CCNC: 29 U/L (ref 1–32)
BASOPHILS # BLD AUTO: 0.04 10*3/MM3 (ref 0–0.2)
BASOPHILS NFR BLD AUTO: 0.5 % (ref 0–1.5)
BILIRUB SERPL-MCNC: 0.4 MG/DL (ref 0–1.2)
BUN SERPL-MCNC: 11 MG/DL (ref 6–20)
BUN/CREAT SERPL: 16.2 (ref 7–25)
CALCIUM SERPL-MCNC: 9.5 MG/DL (ref 8.6–10.5)
CHLORIDE SERPL-SCNC: 101 MMOL/L (ref 98–107)
CHOLEST SERPL-MCNC: 188 MG/DL (ref 0–200)
CO2 SERPL-SCNC: 25.5 MMOL/L (ref 22–29)
CREAT SERPL-MCNC: 0.68 MG/DL (ref 0.57–1)
EGFRCR SERPLBLD CKD-EPI 2021: 118.1 ML/MIN/1.73
EOSINOPHIL # BLD AUTO: 0.09 10*3/MM3 (ref 0–0.4)
EOSINOPHIL NFR BLD AUTO: 1.1 % (ref 0.3–6.2)
ERYTHROCYTE [DISTWIDTH] IN BLOOD BY AUTOMATED COUNT: 12.6 % (ref 12.3–15.4)
FERRITIN SERPL-MCNC: 159 NG/ML (ref 13–150)
GLOBULIN SER CALC-MCNC: 2.4 GM/DL
GLUCOSE SERPL-MCNC: 88 MG/DL (ref 65–99)
HBA1C MFR BLD: 5.3 % (ref 4.8–5.6)
HCT VFR BLD AUTO: 43.9 % (ref 34–46.6)
HDLC SERPL-MCNC: 46 MG/DL (ref 40–60)
HGB BLD-MCNC: 14.6 G/DL (ref 12–15.9)
IMM GRANULOCYTES # BLD AUTO: 0.02 10*3/MM3 (ref 0–0.05)
IMM GRANULOCYTES NFR BLD AUTO: 0.2 % (ref 0–0.5)
LDLC SERPL CALC-MCNC: 124 MG/DL (ref 0–100)
LYMPHOCYTES # BLD AUTO: 2.29 10*3/MM3 (ref 0.7–3.1)
LYMPHOCYTES NFR BLD AUTO: 27.6 % (ref 19.6–45.3)
MCH RBC QN AUTO: 30.2 PG (ref 26.6–33)
MCHC RBC AUTO-ENTMCNC: 33.3 G/DL (ref 31.5–35.7)
MCV RBC AUTO: 90.9 FL (ref 79–97)
MONOCYTES # BLD AUTO: 0.42 10*3/MM3 (ref 0.1–0.9)
MONOCYTES NFR BLD AUTO: 5.1 % (ref 5–12)
NEUTROPHILS # BLD AUTO: 5.44 10*3/MM3 (ref 1.7–7)
NEUTROPHILS NFR BLD AUTO: 65.5 % (ref 42.7–76)
NRBC BLD AUTO-RTO: 0 /100 WBC (ref 0–0.2)
PLATELET # BLD AUTO: 231 10*3/MM3 (ref 140–450)
POTASSIUM SERPL-SCNC: 4.4 MMOL/L (ref 3.5–5.2)
PROT SERPL-MCNC: 7.3 G/DL (ref 6–8.5)
RBC # BLD AUTO: 4.83 10*6/MM3 (ref 3.77–5.28)
SODIUM SERPL-SCNC: 136 MMOL/L (ref 136–145)
TRIGL SERPL-MCNC: 97 MG/DL (ref 0–150)
TSH SERPL DL<=0.005 MIU/L-ACNC: 1.07 UIU/ML (ref 0.27–4.2)
VLDLC SERPL CALC-MCNC: 18 MG/DL (ref 5–40)
WBC # BLD AUTO: 8.3 10*3/MM3 (ref 3.4–10.8)

## 2024-03-25 NOTE — PROGRESS NOTES
Hello!    Here are the results of your most recent labs:    Your CBC, Comprehensive Metabolic Panel, Hgb A1C, and TSH was all normal.     Your vitamin D, ferritin, and Cholesterol was abnormal.     I recommend supplementing with OTC vitamin D 1000 IU daily for 3-6 months, then take 400 IU daily or a prenatal vitamin.     Your ferritin, a marker of iron stores or inflammation, was slightly elevated (159, upper limit of normal is 150). Because it was just a slight elevation, I'd like to repeat it at your next appt to make sure it goes back down to normal.     Your cholesterol was elevated.  For diet and lifestyle intervention, I recommend decreasing intake of trans and saturated fats, and red meat.  Increase physical activity as tolerated.  You may try supplementing with omega-3 1-2g daily, berberine 500mg daily, and/or whole flaxseed if desired. Repeat cholesterol lab at your next annual.     Please continue your current medications.  Please contact me with any questions.    Thank you!  Dr. Navarrete

## 2024-06-20 ENCOUNTER — OFFICE VISIT (OUTPATIENT)
Dept: FAMILY MEDICINE CLINIC | Facility: CLINIC | Age: 34
End: 2024-06-20
Payer: COMMERCIAL

## 2024-06-20 VITALS
HEIGHT: 67 IN | HEART RATE: 71 BPM | DIASTOLIC BLOOD PRESSURE: 74 MMHG | OXYGEN SATURATION: 97 % | BODY MASS INDEX: 28.09 KG/M2 | WEIGHT: 179 LBS | SYSTOLIC BLOOD PRESSURE: 116 MMHG

## 2024-06-20 DIAGNOSIS — F32.A ANXIETY AND DEPRESSION: Primary | ICD-10-CM

## 2024-06-20 DIAGNOSIS — F41.9 ANXIETY AND DEPRESSION: Primary | ICD-10-CM

## 2024-06-20 DIAGNOSIS — Z23 ENCOUNTER FOR IMMUNIZATION: ICD-10-CM

## 2024-06-20 PROCEDURE — 90471 IMMUNIZATION ADMIN: CPT | Performed by: FAMILY MEDICINE

## 2024-06-20 PROCEDURE — 99214 OFFICE O/P EST MOD 30 MIN: CPT | Performed by: FAMILY MEDICINE

## 2024-06-20 PROCEDURE — 90677 PCV20 VACCINE IM: CPT | Performed by: FAMILY MEDICINE

## 2024-06-20 RX ORDER — DULOXETIN HYDROCHLORIDE 30 MG/1
30 CAPSULE, DELAYED RELEASE ORAL DAILY
Qty: 90 CAPSULE | Refills: 3 | Status: SHIPPED | OUTPATIENT
Start: 2024-06-20

## 2024-06-20 NOTE — PATIENT INSTRUCTIONS
Today: Pneumonia vaccine today    Meds: Refilled    Schedule well woman and IUD removal, let us know ahead of time if you want IUD replaced so we can prior auth with insurance.     MH plan:  - Start counseling if not currently established  - Meditation: check out Insight Timer (free saida)  - Sleep hygiene  - Increase physical activity as tolerated- goal 120mins/week  - Acupressure- can try tapping/EFT: https://www.[employer].ca/health-topics/emotional-freedom-technique-eft  - Supplements: magnesium, ashwaganda  - May make appt for Dr. Navarrete's acupuncture clinic if desired- Tuesdays, cash pay  - If you experience any thoughts of harming yourself or someone else, please go to the ER immediately.     Resources:  https://www.apa.org/topics/anxiety/    https://www.apa.org/topics/depression/    https://sleepeducation.org/healthy-sleep/healthy-sleep-habits/    https://www[place] [place][email or web address]/cancer-care/patient-education/acupressure-[email or web address]-anxiety

## 2024-06-20 NOTE — PROGRESS NOTES
Chief Complaint  Chief Complaint   Patient presents with    Anxiety    Depression    Migraine       Subjective    History of Present Illness  Bri Jean is a 33 y.o. female presents to [facility] for followup of anxiety/depression and migraine.    Since her last appt she has had 2 migraines. Tried imtrex 50mg and it did not seem to be effective the first time she took it, the second time it did help. Zofran very helpful.     Current treatment plan: cymbalta 30mg.  Patient symptoms described as no depression symptoms, currently buying a house which is very stressful. Experiencing more worry and irritability.   Patient is not currently in counseling.  Symptoms are not interfering with patient daily life.   Patient does not feel symptoms of panic   Patient does not have trouble sleeping  Patient does feel their medications are working, side effects are not present.   Suicidal ideation: denied by patient  Patient has had no prior hospitalizations.  No legal history noted today.     Patient has Kyleena which is due to be removed, she is also due for Pap.  Patient would like to schedule with PCP.  She has never had children and would like to, currently undecided if she would like to replace IUD when current device is removed.  She would also like to schedule mole removal due to irritation with shaving and catching on skin.    PHQ-9 Depression Screening  Little interest or pleasure in doing things? 0-->not at all   Feeling down, depressed, or hopeless? 0-->not at all   Trouble falling or staying asleep, or sleeping too much?     Feeling tired or having little energy?     Poor appetite or overeating?     Feeling bad about yourself - or that you are a failure or have let yourself or your family down?     Trouble concentrating on things, such as reading the newspaper or watching television?     Moving or speaking so slowly that other people could have noticed? Or the opposite - being so  "fidgety or restless that you have been moving around a lot more than usual?     Thoughts that you would be better off dead, or of hurting yourself in some way?     PHQ-9 Total Score 0   If you checked off any problems, how difficult have these problems made it for you to do your work, take care of things at home, or get along with other people?         GILDA-7 (General Anxiety Disorder-7) from [place]  on 6/20/2024  ** All calculations should be rechecked by clinician prior to use **    RESULT SUMMARY:  8 points  Mild anxiety disorder.    Some studies that applied GILDA-7 to Panic Disorder, Social Phobia, and PTSD would use a cutoff of 8, such that this would be Moderate Anxiety. See Next Steps section for more information.    Functionally, the patient is “somewhat” having difficulty with life tasks due to their symptoms.      INPUTS:  Feeling nervous, anxious, or on edge --> 1 = Several days  Not being able to stop or control worrying --> 1 = Several days  Worrying too much about different things --> 2 = More than half the days  Trouble relaxing --> 1 = Several days  Being so restless that it's hard to sit still --> 1 = Several days  Becoming easily annoyed or irritable --> 2 = More than half the days  Feeling afraid as if something awful might happen --> 0 = Not at all  Ask the patient: how difficult have these problems made it to do work, take care of things at home, or get along with other people? --> 1 = Somewhat difficult      Objective   Vitals:    06/20/24 0832   BP: 116/74   Pulse: 71   SpO2: 97%   Weight: 81.2 kg (179 lb)   Height: 170.2 cm (67.01\")      Physical Exam  Vitals reviewed.   Constitutional:       Appearance: Normal appearance.   HENT:      Head: Normocephalic and atraumatic.   Cardiovascular:      Comments: Well perfused  Pulmonary:      Effort: Pulmonary effort is normal. No respiratory distress.   Abdominal:      General: There is no distension.   Musculoskeletal:         General: Normal " range of motion.   Neurological:      Mental Status: She is alert. Mental status is at baseline.        Assessment and Plan  Bri Jean is a 33 y.o. female presents to [facility] today for followup of anxiety.  Well-controlled, currently undergoing life stressors and response is expected.  Patient to schedule Pap and IUD removal and possible IUD replacement with PCP.  May also schedule mole removal with PCP.      Diagnoses and all orders for this visit:    1. Anxiety and depression (Primary)  Overview:  With stable anxiety/depression, symptoms well controlled on current dose. Patient not SI/HI. Will refill meds. AVS provided information on nonpharmacologic coping strategies.       Orders:  -     DULoxetine (CYMBALTA) 30 MG capsule; Take 1 capsule by mouth Daily.  Dispense: 90 capsule; Refill: 3    2. Encounter for immunization  -     Pneumococcal Conjugate Vaccine 20-Valent All        Patient voiced understanding and agreement with plan of care and had no further questions or concerns at this time.      Problems addressed:  established problem: improved  Risk: prescription drug management    Kelsey Navarrete MD  Family Medicine  [facility]      Follow Up  Return for Well woman and IUD removal.    Patient Instructions   Today: Pneumonia vaccine today    Meds: Refilled    Schedule well woman and IUD removal, let us know ahead of time if you want IUD replaced so we can prior auth with insurance.     MH plan:  - Start counseling if not currently established  - Meditation: check out Insight Timer (free saida)  - Sleep hygiene  - Increase physical activity as tolerated- goal 120mins/week  - Acupressure- can try tapping/EFT: https://www.healthlinkbc.ca/health-topics/emotional-freedom-technique-eft  - Supplements: magnesium, ashwaganda  - May make appt for Dr. Navarrete's acupuncture clinic if desired- Tuesdays, cash pay  - If you experience any thoughts of harming yourself or  someone else, please go to the ER immediately.     Resources:  https://www.apa.org/topics/anxiety/    https://www.apa.org/topics/depression/    https://sleepeducation.org/healthy-sleep/healthy-sleep-habits/    [email or web address]

## 2024-06-21 ENCOUNTER — PROCEDURE VISIT (OUTPATIENT)
Dept: FAMILY MEDICINE CLINIC | Facility: CLINIC | Age: 34
End: 2024-06-21
Payer: COMMERCIAL

## 2024-06-21 VITALS
SYSTOLIC BLOOD PRESSURE: 110 MMHG | OXYGEN SATURATION: 99 % | WEIGHT: 182 LBS | HEIGHT: 67 IN | HEART RATE: 87 BPM | DIASTOLIC BLOOD PRESSURE: 70 MMHG | BODY MASS INDEX: 28.56 KG/M2

## 2024-06-21 DIAGNOSIS — L91.8 SKIN TAGS, MULTIPLE ACQUIRED: ICD-10-CM

## 2024-06-21 DIAGNOSIS — L81.9 PIGMENTED SKIN LESION OF UNCERTAIN BEHAVIOR OF TORSO: Primary | ICD-10-CM

## 2024-06-21 NOTE — PROGRESS NOTES
"Chief Complaint  Chief Complaint   Patient presents with    Procedure     Skin tag removal under arms        Subjective    History of Present Illness  Bri Jean is a 33 y.o. female presents to [facility] for shave biopsy and skin tag removal.  Patient has multiple skin tags in bilateral axilla, left worse than right, skin tags cause significant pain when she is shaving her armpits and become caught on clothing.  In the right axilla there are 2 pigmented lesions that have been present for a while, but are recently enlarging.  Lesions do get caught when shaving her armpit and also become caught on clothing.  She denies history of skin cancer, she does wear sunscreen.    Objective   Vitals:    06/21/24 0758   BP: 110/70   Pulse: 87   SpO2: 99%   Weight: 82.6 kg (182 lb)   Height: 170.2 cm (67.01\")                Physical Exam  Vitals reviewed.   Constitutional:       Appearance: Normal appearance.   HENT:      Head: Normocephalic and atraumatic.   Cardiovascular:      Comments: Well perfused  Pulmonary:      Effort: Pulmonary effort is normal. No respiratory distress.   Abdominal:      General: There is no distension.   Musculoskeletal:         General: Normal range of motion.   Skin:     Comments: Right axilla-superior 0.5 cm fleshy pigmented papule, 1 skin tag, inferior/anterior 0.25 cm round pigmented papule  Left axilla-5 skin tag varying sizes with evidence of irritation from shaving   Neurological:      Mental Status: She is alert. Mental status is at baseline.        Skin Tag Removal    Date/Time: 6/21/2024 8:31 AM    Performed by: Kelsey Navarrete MD  Authorized by: Kelsey Navarrete MD  Preparation: Patient was prepped and draped in the usual sterile fashion.  Local anesthesia used: yes  Anesthesia: local infiltration    Anesthesia:  Local anesthesia used: yes  Local Anesthetic: lidocaine 1% with epinephrine  Anesthetic total: 1 mL    Sedation:  Patient sedated: no    Patient " tolerance: patient tolerated the procedure well with no immediate complications  Comments: Skin tag removed right axilla x 1, left axilla x 5      Biopsy    Date/Time: 2024 8:31 AM    Performed by: Kelsey Navarrete MD  Authorized by: Kelsey Navarrete MD    Procedure Details - Skin Biopsy:     Body area: trunk    Trunk location: R axilla    Initial size (mm): 0.5    Final defect size (mm): 0.75    Malignancy: malignancy unknown      Destruction method: shave biopsy      Comments:   The patient was consented. A verbal timeout was taken confirming patient name//procedure/laterality.     The patient was placed in the supine position on the exam table. The lesion was identified and cleaned with chloraprep x 3. Local anesthetic was administered using a 25 gauge needle x 0.5 mL of 1% lidocaine with epinephrine. The lesion was grasped with sterile pickups and removed using the shave biopsy technique. Repeat second lesion. The specimen was placed in a formalin container and sent for pathology. Hemostasis was achieved using pressure. EBL 0.5mL. Sterile antibiotic ointment and a sterile bandage was applied to the biopsy site.     The patient tolerated the procedure well. The patient was given biopsy site aftercare instructions and supplies.    Biopsy    Date/Time: 2024 8:32 AM    Performed by: Kelsey Navarrete MD  Authorized by: Kelsey Navarrete MD    Procedure Details - Skin Biopsy:     Body area: trunk    Trunk location: R axilla    Initial size (mm): 0.25    Final defect size (mm): 0.5    Malignancy: malignancy unknown      Destruction method: shave biopsy      Assessment and Plan  Bri Jean is a 33 y.o. female presents to [facility] today for followup of contact removal x 6, and shave biopsy right axilla x 2.  Procedure per procedure note.  Patient given aftercare instructions.  Patient tolerated procedure well.      Diagnoses and all orders for this visit:    1. Pigmented skin  lesion of uncertain behavior of torso (Primary)  -     Biopsy  -     Biopsy  -     Reference Histopathology    2. Skin tags, multiple acquired  -     Skin Tag Removal        Patient voiced understanding and agreement with plan of care and had no further questions or concerns at this time.       Kelsey Navarrete MD  Family [facility]      Follow Up  Return for Next scheduled follow up.    Patient Instructions   Post-biopsy instructions:  - Keep biopsy site covered the rest of today  - tomorrow before a shower remove bandage, if no bleeding ok to gently rinse with warm soapy water in shower  - After shower pat area dry, then apply antibiotic ointment or petroleum jelly, gauze if needed, then sterile bandage  - Daily bandage changes for 4-7 days until scab forms  - Once scab forms, recommend keep applying antibiotic ointment or petroleum jelly to help with wound healing, may cover with bandage if desired  - Will notify of pathology results

## 2024-06-21 NOTE — PATIENT INSTRUCTIONS
Post-biopsy instructions:  - Keep biopsy site covered the rest of today  - tomorrow before a shower remove bandage, if no bleeding ok to gently rinse with warm soapy water in shower  - After shower pat area dry, then apply antibiotic ointment or petroleum jelly, gauze if needed, then sterile bandage  - Daily bandage changes for 4-7 days until scab forms  - Once scab forms, recommend keep applying antibiotic ointment or petroleum jelly to help with wound healing, may cover with bandage if desired  - Will notify of pathology results

## 2024-06-26 LAB
DX ICD CODE: NORMAL
PATH REPORT.FINAL DX SPEC: NORMAL
PATH REPORT.GROSS SPEC: NORMAL
PATH REPORT.RELEVANT HX SPEC: NORMAL
PATH REPORT.SITE OF ORIGIN SPEC: NORMAL
PATHOLOGIST NAME: NORMAL
PAYMENT PROCEDURE: NORMAL

## 2024-09-27 ENCOUNTER — PATIENT ROUNDING (BHMG ONLY) (OUTPATIENT)
Dept: FAMILY MEDICINE CLINIC | Facility: CLINIC | Age: 34
End: 2024-09-27
Payer: COMMERCIAL

## 2024-10-01 ENCOUNTER — TELEPHONE (OUTPATIENT)
Dept: FAMILY MEDICINE CLINIC | Facility: CLINIC | Age: 34
End: 2024-10-01
Payer: COMMERCIAL

## 2024-10-01 NOTE — TELEPHONE ENCOUNTER
Pt scheduled for IUD removal, please see if she wants new IUD inserted at that time. If so, Please call pt's insurance to verify IUD coverage and copay.     Procedure date: 10/11/24    ICD-10: Z30.430  Proc: 74588 IUD insertion  Device:  Kyleena IUD    If we do not have device, please let Nehal know ASAP.

## 2024-10-11 ENCOUNTER — PROCEDURE VISIT (OUTPATIENT)
Dept: FAMILY MEDICINE CLINIC | Facility: CLINIC | Age: 34
End: 2024-10-11
Payer: COMMERCIAL

## 2024-10-11 VITALS
RESPIRATION RATE: 16 BRPM | DIASTOLIC BLOOD PRESSURE: 74 MMHG | TEMPERATURE: 97.9 F | OXYGEN SATURATION: 99 % | BODY MASS INDEX: 28.03 KG/M2 | HEIGHT: 67 IN | SYSTOLIC BLOOD PRESSURE: 122 MMHG | WEIGHT: 178.6 LBS | HEART RATE: 72 BPM

## 2024-10-11 DIAGNOSIS — Z11.3 ROUTINE SCREENING FOR STI (SEXUALLY TRANSMITTED INFECTION): ICD-10-CM

## 2024-10-11 DIAGNOSIS — E55.9 VITAMIN D DEFICIENCY: ICD-10-CM

## 2024-10-11 DIAGNOSIS — Z23 ENCOUNTER FOR IMMUNIZATION: ICD-10-CM

## 2024-10-11 DIAGNOSIS — Z30.433 ENCOUNTER FOR IUD REMOVAL AND REINSERTION: ICD-10-CM

## 2024-10-11 DIAGNOSIS — Z01.419 ENCOUNTER FOR ROUTINE GYNECOLOGICAL EXAMINATION WITH PAPANICOLAOU SMEAR OF CERVIX: ICD-10-CM

## 2024-10-11 DIAGNOSIS — N89.8 VAGINAL DRYNESS: Primary | ICD-10-CM

## 2024-10-11 RX ORDER — ESTRADIOL 0.1 MG/G
2 CREAM VAGINAL 3 TIMES WEEKLY
Qty: 42.5 G | Refills: 5 | Status: SHIPPED | OUTPATIENT
Start: 2024-10-11

## 2024-10-11 RX ORDER — LIDOCAINE HYDROCHLORIDE AND EPINEPHRINE 10; 10 MG/ML; UG/ML
5 INJECTION, SOLUTION INFILTRATION; PERINEURAL ONCE
Status: SHIPPED | OUTPATIENT
Start: 2024-10-11

## 2024-10-11 NOTE — PROGRESS NOTES
Chief Complaint   Patient presents with    Contraception    Gynecologic Exam    Vaginal Pain       SUBJECTIVE  Bri Jean is a 34 y.o. female who presents for pap, IUD removal/reinsertion and gynecologic concerns.     History of Present Illness  She reports experiencing a lack of freshness and cleanliness in her vaginal area over the past few months. This is accompanied by intermittent severe dryness and occasional discharge, sometimes with an odor. She also mentions a decrease in sexual desire and discomfort during intercourse, which she attributes to the dryness. Additionally, she has noticed small cysts on her inner labia that cause significant pain when sitting and occasionally burst. She recalls having a large cyst a few weeks ago that has since almost disappeared. She believes these issues may be due to friction and lack of ventilation in the area. She typically wears spandex-type underwear and has noticed a decrease in natural lubrication during intercourse as she ages.    She expresses interest in undergoing a full sexually transmitted infection (STI) panel, including blood work, even though she does not have any specific concerns about exposure. She also wishes to have her vitamin D levels checked. She does not require any medication refills at this time.    She is currently using the Yara intrauterine device (IUD) for contraception and has not experienced any issues with it. She requests a new IUD.         OBJECTIVE  Vitals:    10/11/24 1239   BP: 122/74   Pulse: 72   Resp: 16   Temp: 97.9 °F (36.6 °C)   SpO2: 99%       Physical Exam  Vitals reviewed. Exam conducted with a chaperone present.   Constitutional:       Appearance: Normal appearance.   HENT:      Head: Normocephalic and atraumatic.   Cardiovascular:      Comments: Well perfused  Pulmonary:      Effort: Pulmonary effort is normal. No respiratory distress.   Abdominal:      General: There is no distension.   Genitourinary:     Labia:          Right: No tenderness.         Left: No tenderness.       Vagina: Bleeding (with pap) present. No vaginal discharge or tenderness.      Cervix: Normal. No discharge or erythema.      Comments: Thin Prep and NuSwab collected prior to procedure  Musculoskeletal:         General: Normal range of motion.   Neurological:      Mental Status: She is alert. Mental status is at baseline.       PROCEDURE:  Patient was appropriately consented for the procedure and placed in the supine position on the exam table.    The speculum was inserted and the cervix and IUD strings were identified. Sterile long forceps were used to grasp the IUD strings and the device was removed per  protocol. Intact device shown to patient. Betadine was used to clean the cervix x 3 swabs. A cervical block was placed using a 21g needle and 1mL of 1% lidocaine with epi at 12, 3, 6, and 9 o clock on the cervix. After local anesthetic was applied, the tenaculum was applied to the anterior/superior cervix. A sterile intrauterine sound was used to dilate the cervix and sound the uterus. After sounding to a depth of 7.5 centimeters, an intrauterine device was inserted per  protocol.  The string was cut to a 3 centimeters length and a sample of the remaining string was given to the patient for later comparison. The speculum was removed.     The patient tolerated the procedure well, she did feel hot and cramping, improved with ice pack and apple juice; EBL less than 5 ml.  Aftercare instructions provided.       The following data was reviewed by: Kelsey Navarrete MD on 10/11/2024:  CMP          3/22/2024    15:30   CMP   Glucose 88    BUN 11    Creatinine 0.68    Sodium 136    Potassium 4.4    Chloride 101    Calcium 9.5    Total Protein 7.3    Albumin 4.9    Globulin 2.4    Total Bilirubin 0.4    Alkaline Phosphatase 58    AST (SGOT) 29    ALT (SGPT) 13    BUN/Creatinine Ratio 16.2      CBC          3/22/2024    15:30   CBC   WBC 8.30     RBC 4.83    Hemoglobin 14.6    Hematocrit 43.9    MCV 90.9    MCH 30.2    MCHC 33.3    RDW 12.6    Platelets 231      Lipid Panel          3/22/2024    15:30   Lipid Panel   Total Cholesterol 188    Triglycerides 97    HDL Cholesterol 46    VLDL Cholesterol 18    LDL Cholesterol  124      TSH          3/22/2024    15:30   TSH   TSH 1.070      Most Recent A1C          3/22/2024    15:30   HGBA1C Most Recent   Hemoglobin A1C 5.30    Vit D  Last clinic note      ASSESSMENT/PLAN  Bri Jean is a 34 y.o. female presenting for pap, IUD removal/reinsertion and gynecologic concerns.     Assessment & Plan  1. Vaginal discomfort.  She reports alternating between severe dryness and discharge, sometimes with odor, and experiencing pain during sex, possibly due to dryness. Tiny pea-sized cysts on the inner labia were noted, which may be related to friction and lack of breathability. Hidradenitis suppurativa was discussed as a possible diagnosis. Cotton underwear was recommended over spandex for better breathability. Vaginal estrogen cream, to be applied three times a week, was prescribed to help with dryness and discomfort. She was advised to monitor for any burning sensation from the cream and to report if it occurs. General vaginal health tips were provided, including avoiding tight clothing, using unscented products, and urinating after sex. If symptoms do not improve within 4 weeks, a referral for pelvic floor physical therapy will be considered. A test for bacterial vaginosis (BV) will be conducted.    2. Health maintenance.  Her last labs in March 2024 showed slightly low vitamin D levels and improved cholesterol. Blood work was ordered to check for STIs, including HIV with reflex and syphilis, as well as vitamin D levels. Hepatitis screening was not deemed necessary due to consistently normal liver function tests and no risk factors such as heroin use. She declined the COVID-19 vaccine due to family history of  thyroid issues.    3. Pap and Intrauterine device replacement.  A Pap smear will be performed, followed by the removal of her current IUD and insertion of a Kyleena IUD, effective for 5 years. She was informed that irregular periods may occur for the first 3 to 6 months following IUD replacement. She was advised to use a heating pad, ibuprofen, and Tylenol for cramping, and reassured that initial cramping and bleeding are normal. She was also informed that a string check is not necessary unless she or her partner cannot feel the strings.    Follow-up  Patient will return in 6 months for her annual exam.    Diagnoses and all orders for this visit:    1. Vaginal dryness (Primary)  -     estradiol (ESTRACE VAGINAL) 0.1 MG/GM vaginal cream; Insert 2 g into the vagina 3 (Three) Times a Week.  Dispense: 42.5 g; Refill: 5    2. Vitamin D deficiency  -     Vitamin D,25-Hydroxy    3. Encounter for IUD removal and reinsertion  -     levonorgestrel (KYLEENA) 19.5 MG IUD 1 each    4. Encounter for immunization  -     Fluzone >6mos ([id number])    5. Encounter for routine gynecological examination with Papanicolaou smear of cervix  -     IGP, Aptima HPV, Rfx 16 / 18,45    6. Routine screening for STI (sexually transmitted infection)  -     NuSwab VG+, Candida 6sp  -     HIV-1 / O / 2 Ag / Antibody  -     RPR Qualitative with Reflex to Quant          Patient voiced understanding and agreement with plan of care and had no further questions or concerns at this time.     I spent 30 minutes caring for Bri Jean on this date of service. This time includes time spent by me in the following activities: preparing for the visit, reviewing tests, obtaining and/or reviewing a separately obtained history, performing a medically appropriate examination and/or evaluation, counseling and educating the patient/family/caregiver, ordering medications, tests, or procedures, documenting information in the medical record, and independently  interpreting results and communicating that information with the patient/family/caregiver. I spent 5 minutes on the separately reported service of IUD removal and reinsertion. This time is not included in the time used to support the E/M service also reported today.     Problems addressed:  new presenting problem: requiring additional assessment, consultation, or diagnostic studies  Complexity: labs ordered yes, labs reviewed yes  Risk: prescription drug management    Kelsey Navarrete MD  [facility]      Return in about 6 months (around 4/11/2025) for Annual physical.    Patient Instructions   Today: IUD removal and reinsertion, vaccines, labs, pap    Meds: Vaginal estrogen 3x weekly- if that doesn't help let me know and will refer to pelvic floor PT    To keep your lady parts happy here are my recommendations:  - avoid prolonged wear of tight fitting clothing  - wear cotton underwear, or sleep with no underwear  - do not use douches, feminine wipes/washes/sprays, or use soap between the labia  - use unscented laundry products and no dryer sheets on underwear  - urinate after sex  - wash any sex toys after use with appropriate wash, rinse, and allow to dry  - avoid scented condoms or flavored lubricant if you are sensitive to those   - do not put any porous material in the vagina that isn't meant to be there (tampons are ok; sponges, haily are not)  - ensure no menstrual products are left in the vagina    IUD PLAN:  Congratulations! You have chosen one of the safest and most effective form of birth control! Your device is good for 5 years.     - Take the rest of today easy- you may use a heating pad for 30 minutes every 2 hours, 800mg ibuprofen every 8 hrs or tylenol 1000mg every 6 hours with food and water.If you need a note for work or school please let us know.   - Cramping and bleeding up to a week after IUD insertion is normal. It is NOT normal to have cramping that does not  respond to tylenol/ibuprofen, heavy bleeding that does not improve within a week, foul-smelling discharge, nausea/vomiting, or temperature > 100.4. Please go to the ER if you experience these symptoms.   - You may have irregular periods for 3-6 months after IUD insertion as your body adjusts to the device, this is normal. If you are unhappy with the IUD after 3-6 months let me know and we will discuss removal vs other strategies.   - Although most of the hormone stays in the pelvis, you may experience some mood changes. It's uncommon, but if you experience sudden worsening of anxiety/depression or thoughts of harming yourself, please call the clinic or go to the ER immediately. We may need to remove your device.   - You do not need to return to clinic for a string check unless you are concerned your device is missing or dislodged. Check your strings monthly by inserting your fingers in the vagina and feeling for the strings, sometimes they twist backwards around the cervix.   - If you cannot feel your strings, your device is missing or dislodged, please contact the clinic immediately and use barrier contraception when having sex until your IUD is confirmed to be in the correct location.

## 2024-10-11 NOTE — PATIENT INSTRUCTIONS
Today: IUD removal and reinsertion, vaccines, labs, pap    Meds: Vaginal estrogen 3x weekly- if that doesn't help let me know and will refer to pelvic floor PT    To keep your lady parts happy here are my recommendations:  - avoid prolonged wear of tight fitting clothing  - wear cotton underwear, or sleep with no underwear  - do not use douches, feminine wipes/washes/sprays, or use soap between the labia  - use unscented laundry products and no dryer sheets on underwear  - urinate after sex  - wash any sex toys after use with appropriate wash, rinse, and allow to dry  - avoid scented condoms or flavored lubricant if you are sensitive to those   - do not put any porous material in the vagina that isn't meant to be there (tampons are ok; sponges, haily are not)  - ensure no menstrual products are left in the vagina    IUD PLAN:  Congratulations! You have chosen one of the safest and most effective form of birth control! Your device is good for 5 years.     - Take the rest of today easy- you may use a heating pad for 30 minutes every 2 hours, 800mg ibuprofen every 8 hrs or tylenol 1000mg every 6 hours with food and water.If you need a note for work or school please let us know.   - Cramping and bleeding up to a week after IUD insertion is normal. It is NOT normal to have cramping that does not respond to tylenol/ibuprofen, heavy bleeding that does not improve within a week, foul-smelling discharge, nausea/vomiting, or temperature > 100.4. Please go to the ER if you experience these symptoms.   - You may have irregular periods for 3-6 months after IUD insertion as your body adjusts to the device, this is normal. If you are unhappy with the IUD after 3-6 months let me know and we will discuss removal vs other strategies.   - Although most of the hormone stays in the pelvis, you may experience some mood changes. It's uncommon, but if you experience sudden worsening of anxiety/depression or thoughts of harming yourself,  please call the clinic or go to the ER immediately. We may need to remove your device.   - You do not need to return to clinic for a string check unless you are concerned your device is missing or dislodged. Check your strings monthly by inserting your fingers in the vagina and feeling for the strings, sometimes they twist backwards around the cervix.   - If you cannot feel your strings, your device is missing or dislodged, please contact the clinic immediately and use barrier contraception when having sex until your IUD is confirmed to be in the correct location.

## 2024-10-12 LAB
25(OH)D3+25(OH)D2 SERPL-MCNC: 22.3 NG/ML (ref 30–100)
HIV 1+2 AB+HIV1 P24 AG SERPL QL IA: NON REACTIVE
RPR SER QL: NON REACTIVE

## 2024-10-15 DIAGNOSIS — N76.0 BACTERIAL VAGINOSIS: Primary | ICD-10-CM

## 2024-10-15 DIAGNOSIS — B96.89 BACTERIAL VAGINOSIS: Primary | ICD-10-CM

## 2024-10-15 LAB
A VAGINAE DNA VAG QL NAA+PROBE: ABNORMAL SCORE
BVAB2 DNA VAG QL NAA+PROBE: ABNORMAL SCORE
C ALBICANS DNA VAG QL NAA+PROBE: NEGATIVE
C GLABRATA DNA VAG QL NAA+PROBE: NEGATIVE
C KRUSEI DNA VAG QL NAA+PROBE: NEGATIVE
C LUSITANIAE DNA VAG QL NAA+PROBE: NEGATIVE
C TRACH DNA SPEC QL NAA+PROBE: NEGATIVE
CANDIDA DNA VAG QL NAA+PROBE: NEGATIVE
MEGA1 DNA VAG QL NAA+PROBE: ABNORMAL SCORE
N GONORRHOEA DNA VAG QL NAA+PROBE: NEGATIVE
T VAGINALIS DNA VAG QL NAA+PROBE: NEGATIVE

## 2024-10-15 RX ORDER — METRONIDAZOLE 500 MG/1
500 TABLET ORAL 2 TIMES DAILY
Qty: 10 TABLET | Refills: 0 | Status: SHIPPED | OUTPATIENT
Start: 2024-10-15 | End: 2024-10-20

## 2024-10-17 LAB
CYTOLOGIST CVX/VAG CYTO: NORMAL
CYTOLOGY CVX/VAG DOC CYTO: NORMAL
CYTOLOGY CVX/VAG DOC THIN PREP: NORMAL
DX ICD CODE: NORMAL
HPV GENOTYPE REFLEX: NORMAL
HPV I/H RISK 4 DNA CVX QL PROBE+SIG AMP: NEGATIVE
Lab: NORMAL
Lab: NORMAL
OTHER STN SPEC: NORMAL
RECOM F/U CVX/VAG CYTO: NORMAL
STAT OF ADQ CVX/VAG CYTO-IMP: NORMAL

## 2025-08-12 ENCOUNTER — OFFICE VISIT (OUTPATIENT)
Dept: FAMILY MEDICINE CLINIC | Facility: CLINIC | Age: 35
End: 2025-08-12
Payer: COMMERCIAL

## 2025-08-12 VITALS
HEART RATE: 95 BPM | SYSTOLIC BLOOD PRESSURE: 120 MMHG | TEMPERATURE: 99 F | BODY MASS INDEX: 26.37 KG/M2 | HEIGHT: 67 IN | WEIGHT: 168 LBS | DIASTOLIC BLOOD PRESSURE: 78 MMHG | OXYGEN SATURATION: 98 %

## 2025-08-12 DIAGNOSIS — F51.5 NIGHTMARES: ICD-10-CM

## 2025-08-12 DIAGNOSIS — F43.0 ACUTE STRESS REACTION: Primary | ICD-10-CM

## 2025-08-12 DIAGNOSIS — F32.A ANXIETY AND DEPRESSION: ICD-10-CM

## 2025-08-12 DIAGNOSIS — F41.9 ANXIETY AND DEPRESSION: ICD-10-CM

## 2025-08-12 PROCEDURE — 96127 BRIEF EMOTIONAL/BEHAV ASSMT: CPT | Performed by: FAMILY MEDICINE

## 2025-08-12 PROCEDURE — 99214 OFFICE O/P EST MOD 30 MIN: CPT | Performed by: FAMILY MEDICINE

## 2025-08-12 RX ORDER — PRAZOSIN HYDROCHLORIDE 1 MG/1
CAPSULE ORAL
Qty: 45 CAPSULE | Refills: 0 | Status: SHIPPED | OUTPATIENT
Start: 2025-08-12 | End: 2025-08-27

## 2025-08-12 RX ORDER — DULOXETIN HYDROCHLORIDE 30 MG/1
30 CAPSULE, DELAYED RELEASE ORAL 2 TIMES DAILY
Qty: 90 CAPSULE | Refills: 3 | Status: SHIPPED | OUTPATIENT
Start: 2025-08-12

## 2025-08-25 ENCOUNTER — OFFICE VISIT (OUTPATIENT)
Dept: FAMILY MEDICINE CLINIC | Facility: CLINIC | Age: 35
End: 2025-08-25
Payer: COMMERCIAL

## 2025-08-25 VITALS
OXYGEN SATURATION: 100 % | WEIGHT: 175 LBS | TEMPERATURE: 98.4 F | SYSTOLIC BLOOD PRESSURE: 100 MMHG | HEIGHT: 67 IN | BODY MASS INDEX: 27.47 KG/M2 | HEART RATE: 72 BPM | DIASTOLIC BLOOD PRESSURE: 68 MMHG

## 2025-08-25 DIAGNOSIS — R79.89 ELEVATED FERRITIN: ICD-10-CM

## 2025-08-25 DIAGNOSIS — E55.9 VITAMIN D DEFICIENCY: ICD-10-CM

## 2025-08-25 DIAGNOSIS — R73.01 ELEVATED FASTING GLUCOSE: ICD-10-CM

## 2025-08-25 DIAGNOSIS — F43.0 ACUTE STRESS REACTION: ICD-10-CM

## 2025-08-25 DIAGNOSIS — E78.2 MODERATE MIXED HYPERLIPIDEMIA NOT REQUIRING STATIN THERAPY: ICD-10-CM

## 2025-08-25 DIAGNOSIS — F51.5 NIGHTMARES: Primary | ICD-10-CM

## 2025-08-25 PROCEDURE — 99214 OFFICE O/P EST MOD 30 MIN: CPT | Performed by: FAMILY MEDICINE

## 2025-08-25 RX ORDER — PRAZOSIN HYDROCHLORIDE 2 MG/1
4 CAPSULE ORAL NIGHTLY
Qty: 60 CAPSULE | Refills: 1 | Status: SHIPPED | OUTPATIENT
Start: 2025-08-25